# Patient Record
Sex: MALE | Race: ASIAN | NOT HISPANIC OR LATINO
[De-identification: names, ages, dates, MRNs, and addresses within clinical notes are randomized per-mention and may not be internally consistent; named-entity substitution may affect disease eponyms.]

---

## 2019-02-17 DIAGNOSIS — Z86.79 PERSONAL HISTORY OF OTHER DISEASES OF THE CIRCULATORY SYSTEM: ICD-10-CM

## 2019-02-18 ENCOUNTER — APPOINTMENT (OUTPATIENT)
Dept: CARDIOLOGY | Facility: CLINIC | Age: 59
End: 2019-02-18
Payer: COMMERCIAL

## 2019-02-18 ENCOUNTER — RX RENEWAL (OUTPATIENT)
Age: 59
End: 2019-02-18

## 2019-02-18 VITALS
WEIGHT: 192 LBS | HEART RATE: 64 BPM | TEMPERATURE: 98.4 F | OXYGEN SATURATION: 97 % | HEIGHT: 68 IN | BODY MASS INDEX: 29.1 KG/M2 | SYSTOLIC BLOOD PRESSURE: 132 MMHG | DIASTOLIC BLOOD PRESSURE: 83 MMHG

## 2019-02-18 DIAGNOSIS — Z82.0 FAMILY HISTORY OF EPILEPSY AND OTHER DISEASES OF THE NERVOUS SYSTEM: ICD-10-CM

## 2019-02-18 DIAGNOSIS — Z82.49 FAMILY HISTORY OF ISCHEMIC HEART DISEASE AND OTHER DISEASES OF THE CIRCULATORY SYSTEM: ICD-10-CM

## 2019-02-18 DIAGNOSIS — Z82.5 FAMILY HISTORY OF ASTHMA AND OTHER CHRONIC LOWER RESPIRATORY DISEASES: ICD-10-CM

## 2019-02-18 DIAGNOSIS — Z78.9 OTHER SPECIFIED HEALTH STATUS: ICD-10-CM

## 2019-02-18 DIAGNOSIS — Z86.39 PERSONAL HISTORY OF OTHER ENDOCRINE, NUTRITIONAL AND METABOLIC DISEASE: ICD-10-CM

## 2019-02-18 PROCEDURE — 99205 OFFICE O/P NEW HI 60 MIN: CPT

## 2019-02-18 PROCEDURE — 93000 ELECTROCARDIOGRAM COMPLETE: CPT

## 2019-02-20 ENCOUNTER — NON-APPOINTMENT (OUTPATIENT)
Age: 59
End: 2019-02-20

## 2019-02-20 PROBLEM — Z86.39 HISTORY OF HYPERTHYROIDISM: Status: RESOLVED | Noted: 2019-02-20 | Resolved: 2019-02-20

## 2019-02-20 PROBLEM — Z82.49 FAMILY HISTORY OF ATRIAL FIBRILLATION: Status: ACTIVE | Noted: 2019-02-20

## 2019-02-20 PROBLEM — Z78.9 ALCOHOL USE: Status: ACTIVE | Noted: 2019-02-20

## 2019-02-20 PROBLEM — Z82.0 FAMILY HISTORY OF PARKINSON'S DISEASE: Status: ACTIVE | Noted: 2019-02-20

## 2019-02-20 PROBLEM — Z82.5 FAMILY HISTORY OF CHRONIC OBSTRUCTIVE PULMONARY DISEASE: Status: ACTIVE | Noted: 2019-02-20

## 2019-02-22 ENCOUNTER — TRANSCRIPTION ENCOUNTER (OUTPATIENT)
Age: 59
End: 2019-02-22

## 2019-02-23 NOTE — DISCUSSION/SUMMARY
[FreeTextEntry1] : Atherosclerotic heart disease\par Mr. Carranza has known atherosclerotic heart disease. Although details are not available he reportedly had a myocardial infarction in 1999 requiring coronary stenting. An additional coronary stent was required in 2009. The details angiographic reports noninvasive studies etc. are all not presently available for review.\par \par In view of the lack of symptoms and clinical course continued medical management is indicated at this time. Review of prior cardiac records not available at this time would be helpful for management decisions.\par \par I have recommended the following:\par 1 risk factor modification\par 2 continue present medical regimen\par 3 previous cardiac records including angiographic reports, cardiology notes stress test laboratory studies etc. are requested for review as discussed above. \par 4. Further cardiac evaluation and treatment will be dependent on the review of the above data\par \par \par \par \par Hypertension\par Hypertension has reportedly been controlled on the present medical regimen. In the setting of atherosclerotic heart disease and previous myocardial infarction beta blocker and ACE-I./ARB therapy  are attractive.. The doses of both valsartan and metoprolol (with monitoring for bradycardia) may be increased if required to maintain optimal levels.\par \par I have recommended the following:\par 1 low-salt low-fat low-cholesterol diet. Regular aerobic exercise\par 2 continue present medical regimen\par 3 increase valsartan and/or metoprolol   (with monitoring for bradycardia) if required to maintain optimal levels as discussed above\par \par \par Hyperlipidemia\par Hyperlipidemia represents a risk factor for progressive atherosclerotic disease. The target LDL level with known atherosclerotic heart disease is about 70. The most recent lipid levels are not available for review. In 6/17 the serum cholesterol level was 187 triglycerides 205 HDL 45 and . The dose of simvastatin may be increased or changed to a high intensity statin (Crestor or Lipitor)  if required to obtain optimal levels. Nonpharmacological therapy, specifically diet and exercise are emphasized  as major aspects of treatment.\par \par I recommended the following:\par 1 low-salt low-fat low-cholesterol diet. Regular aerobic exercise\par 2 continue present medical regimen\par 3 routine laboratories data including lipid profile through primary care Dr. Aldana\par 4 target LDL level to about 70 as discussed above\par 5 increase simvastatin dose or change to high intensity statin if required to obtain optimal levels as discussed above.

## 2019-02-23 NOTE — PHYSICAL EXAM
[Normal Conjunctiva] : the conjunctiva exhibited no abnormalities [Heart Rate And Rhythm] : heart rate and rhythm were normal [Heart Sounds] : normal S1 and S2 [Murmurs] : no murmurs present [Arterial Pulses Normal] : the arterial pulses were normal [Edema] : no peripheral edema present [Veins - Varicosity Changes] : no varicosital changes were noted in the lower extremities [Auscultation Breath Sounds / Voice Sounds] : lungs were clear to auscultation bilaterally [Bowel Sounds] : normal bowel sounds [Abdomen Soft] : soft [Abdomen Tenderness] : non-tender [] : no rash [Skin Lesions] : no skin lesions [Affect] : the affect was normal [Abnormal Walk] : normal gait [FreeTextEntry1] : pleasant

## 2019-02-23 NOTE — HISTORY OF PRESENT ILLNESS
[FreeTextEntry1] : 58-year-old man\par Cardiology consultation requested by  because of atherosclerotic heart disease\par \par Mr. Carranza has known atherosclerotic heart disease. Although details are not available he reportedly had a myocardial infarction in 1999 requiring coronary stenting. An additional coronary stent was required in 2009. The details angiographic reports noninvasive studies etc. are all not presently available for review.\par \par Since that time he has had no recurrent cardiac event. He attributes the coronary disease between 1999 and 2009 do to stress related to his job in the [employer] with associated alcohol and cocaine use which is now stopped. \par \par He presently feels well and in particular denies chest pain shortness of breath palpitations or syncope.\par \par There is no history of diabetes. He has known hyperlipidemia and hypertension controlled with medical therapy .\par \par Tucker presents now for cardiovascular reevaluation.

## 2019-03-16 ENCOUNTER — LABORATORY RESULT (OUTPATIENT)
Age: 59
End: 2019-03-16

## 2019-03-16 ENCOUNTER — APPOINTMENT (OUTPATIENT)
Dept: INTERNAL MEDICINE | Facility: CLINIC | Age: 59
End: 2019-03-16
Payer: COMMERCIAL

## 2019-03-16 PROCEDURE — 36415 COLL VENOUS BLD VENIPUNCTURE: CPT

## 2019-03-18 ENCOUNTER — RX CHANGE (OUTPATIENT)
Age: 59
End: 2019-03-18

## 2019-03-18 LAB
ALBUMIN SERPL ELPH-MCNC: 4.9 G/DL
ALP BLD-CCNC: 59 U/L
ALT SERPL-CCNC: 31 U/L
ANION GAP SERPL CALC-SCNC: 13 MMOL/L
AST SERPL-CCNC: 23 U/L
BILIRUB SERPL-MCNC: 0.2 MG/DL
BUN SERPL-MCNC: 14 MG/DL
CALCIUM SERPL-MCNC: 9.7 MG/DL
CHLORIDE SERPL-SCNC: 106 MMOL/L
CHOLEST SERPL-MCNC: 171 MG/DL
CHOLEST/HDLC SERPL: 4.6 RATIO
CO2 SERPL-SCNC: 24 MMOL/L
CREAT SERPL-MCNC: 1.02 MG/DL
GLUCOSE SERPL-MCNC: 94 MG/DL
HBA1C MFR BLD HPLC: 6.3 %
HDLC SERPL-MCNC: 37 MG/DL
LDLC SERPL CALC-MCNC: 89 MG/DL
POTASSIUM SERPL-SCNC: 4.6 MMOL/L
PROT SERPL-MCNC: 7 G/DL
SODIUM SERPL-SCNC: 143 MMOL/L
TRIGL SERPL-MCNC: 226 MG/DL
TSH SERPL-ACNC: 3.9 UIU/ML

## 2019-03-20 ENCOUNTER — RX RENEWAL (OUTPATIENT)
Age: 59
End: 2019-03-20

## 2019-03-20 ENCOUNTER — RX CHANGE (OUTPATIENT)
Age: 59
End: 2019-03-20

## 2019-03-31 ENCOUNTER — TRANSCRIPTION ENCOUNTER (OUTPATIENT)
Age: 59
End: 2019-03-31

## 2019-04-17 ENCOUNTER — CLINICAL ADVICE (OUTPATIENT)
Age: 59
End: 2019-04-17

## 2019-06-25 ENCOUNTER — CLINICAL ADVICE (OUTPATIENT)
Age: 59
End: 2019-06-25

## 2019-07-08 ENCOUNTER — RX CHANGE (OUTPATIENT)
Age: 59
End: 2019-07-08

## 2019-07-10 ENCOUNTER — CLINICAL ADVICE (OUTPATIENT)
Age: 59
End: 2019-07-10

## 2019-08-02 ENCOUNTER — RX CHANGE (OUTPATIENT)
Age: 59
End: 2019-08-02

## 2019-08-26 ENCOUNTER — RX RENEWAL (OUTPATIENT)
Age: 59
End: 2019-08-26

## 2019-11-18 ENCOUNTER — RX RENEWAL (OUTPATIENT)
Age: 59
End: 2019-11-18

## 2019-11-22 ENCOUNTER — RX RENEWAL (OUTPATIENT)
Age: 59
End: 2019-11-22

## 2019-12-24 ENCOUNTER — RX CHANGE (OUTPATIENT)
Age: 59
End: 2019-12-24

## 2019-12-24 DIAGNOSIS — J01.00 ACUTE MAXILLARY SINUSITIS, UNSPECIFIED: ICD-10-CM

## 2019-12-24 DIAGNOSIS — J06.9 ACUTE UPPER RESPIRATORY INFECTION, UNSPECIFIED: ICD-10-CM

## 2019-12-24 DIAGNOSIS — Z87.2 PERSONAL HISTORY OF DISEASES OF THE SKIN AND SUBCUTANEOUS TISSUE: ICD-10-CM

## 2020-02-01 ENCOUNTER — LABORATORY RESULT (OUTPATIENT)
Age: 60
End: 2020-02-01

## 2020-02-01 ENCOUNTER — APPOINTMENT (OUTPATIENT)
Dept: FAMILY MEDICINE | Facility: CLINIC | Age: 60
End: 2020-02-01
Payer: COMMERCIAL

## 2020-02-01 VITALS
SYSTOLIC BLOOD PRESSURE: 132 MMHG | HEART RATE: 72 BPM | BODY MASS INDEX: 29.86 KG/M2 | HEIGHT: 68 IN | WEIGHT: 197 LBS | DIASTOLIC BLOOD PRESSURE: 90 MMHG

## 2020-02-01 VITALS — SYSTOLIC BLOOD PRESSURE: 118 MMHG | DIASTOLIC BLOOD PRESSURE: 90 MMHG

## 2020-02-01 DIAGNOSIS — M10.9 GOUT, UNSPECIFIED: ICD-10-CM

## 2020-02-01 PROCEDURE — G0442 ANNUAL ALCOHOL SCREEN 15 MIN: CPT | Mod: NC,59

## 2020-02-01 PROCEDURE — 36415 COLL VENOUS BLD VENIPUNCTURE: CPT

## 2020-02-01 PROCEDURE — 90471 IMMUNIZATION ADMIN: CPT

## 2020-02-01 PROCEDURE — 99396 PREV VISIT EST AGE 40-64: CPT | Mod: 25

## 2020-02-01 PROCEDURE — 90715 TDAP VACCINE 7 YRS/> IM: CPT

## 2020-02-01 PROCEDURE — G0447 BEHAVIOR COUNSEL OBESITY 15M: CPT | Mod: NC

## 2020-02-01 RX ORDER — AMOXICILLIN AND CLAVULANATE POTASSIUM 875; 125 MG/1; MG/1
875-125 TABLET, COATED ORAL TWICE DAILY
Qty: 28 | Refills: 1 | Status: DISCONTINUED | COMMUNITY
Start: 2019-03-20 | End: 2020-02-01

## 2020-02-01 RX ORDER — FINASTERIDE 5 MG/1
5 TABLET, FILM COATED ORAL DAILY
Qty: 30 | Refills: 2 | Status: DISCONTINUED | COMMUNITY
Start: 2019-07-08 | End: 2020-02-01

## 2020-02-01 NOTE — ASSESSMENT
[FreeTextEntry1] : BP is controlled\par Weight is too high- must focus on reduced intake and regular exercise,\par Back strain appears mechanical.\par Due for colon cancer scrrning\par Needs monitoring of his lipids, HgbA1c and TFTs given his history. \par

## 2020-02-01 NOTE — HEALTH RISK ASSESSMENT
[Very Good] : ~his/her~  mood as very good [Good] : ~his/her~ current health as good [2 - 4 times a month (2 pts)] : 2-4 times a month (2 points) [Yes] : Yes [Never (0 pts)] : Never (0 points) [1 or 2 (0 pts)] : 1 or 2 (0 points) [No] : In the past 12 months have you used drugs other than those required for medical reasons? No [No falls in past year] : Patient reported no falls in the past year [(PHQ-2) Unable to screen] : PHQ-2: unable to screen [0] : 2) Feeling down, depressed, or hopeless: Not at all (0) [Patient declined Retinal Exam] : Patient declined Retinal Exam. [Patient reported colonoscopy was normal] : Patient reported colonoscopy was normal [HIV test declined] : HIV test declined [Hepatitis C test declined] : Hepatitis C test declined [# of Members in Household ___] :  household currently consist of [unfilled] member(s) [With Significant Other] : lives with significant other [None] : None [Employed] : employed [College] : College [# Of Children ___] : has [unfilled] children [] :  [Sexually Active] : sexually active [Fully functional (bathing, dressing, toileting, transferring, walking, feeding)] : Fully functional (bathing, dressing, toileting, transferring, walking, feeding) [Fully functional (using the telephone, shopping, preparing meals, housekeeping, doing laundry, using] : Fully functional and needs no help or supervision to perform IADLs (using the telephone, shopping, preparing meals, housekeeping, doing laundry, using transportation, managing medications and managing finances) [Smoke Detector] : smoke detector [Sunscreen] : uses sunscreen [Carbon Monoxide Detector] : carbon monoxide detector [With Patient/Caregiver] : With Patient/Caregiver [Name: ___] : Health Care Proxy's Name: [unfilled]  [Relationship: ___] : Relationship: [unfilled] [Designated Healthcare Proxy] : Designated healthcare proxy [DNR] : DNR [DNI] : DNI [FreeTextEntry1] : right lower back and buttock [] : No [Audit-CScore] : 2 [de-identified] : Fair [de-identified] : None [[id number]] : 0 [Change in mental status noted] : No change in mental status noted [High Risk Behavior] : no high risk behavior [Reports changes in hearing] : Reports no changes in hearing [Reports changes in vision] : Reports no changes in vision [Reports changes in dental health] : Reports no changes in dental health [Guns at Home] : no guns at home [ColonoscopyDate] : 08/10 [AdvancecareDate] : 02/20

## 2020-02-01 NOTE — COUNSELING
[Benefits of weight loss discussed] : Benefits of weight loss discussed [Encouraged to increase physical activity] : Encouraged to increase physical activity [Target Wt Loss Goal ___] : Weight Loss Goals: Target weight loss goal [unfilled] lbs [Decrease Portions] : decrease portions [Good understanding] : Patient has a good understanding of disease, goals and obesity follow-up plan [FreeTextEntry2] : Over intake of carbs and sugars.  [FreeTextEntry4] : 15

## 2020-02-01 NOTE — HISTORY OF PRESENT ILLNESS
[FreeTextEntry1] : Annual PE\par Needs TdaP [de-identified] : Mr. ALEC GARCIA is a 59 year old male here today for annual physical. Hx of CAD - followed by cardiology.\par Hx of hypothyroidism following ablation for hyperthyroidism. On Synthroid\par pt c/o right lower back and buttock pain for last few weeks- seeing chiropractor and taking NSAIDs. Back is very stiff and worse in the am. Has tried heat.\par Travellng to [place] in 2 weeks- needs TdaP and [facility].\par Overdue for colon cancer screening. LAst one was 10 years. No FH of colon cancer.\par

## 2020-02-01 NOTE — PLAN
[FreeTextEntry1] : Encouraged patient to continue to focus on weight through sensible and healthy eating and as much aerobic exercise as is tolerable.\par Reduce salt intake.\par Continue current medications. \par Encouraged continued use of NSAIDs and heat. \par

## 2020-02-01 NOTE — PHYSICAL EXAM
[Deep Tendon Reflexes (DTR)] : deep tendon reflexes were 2+ and symmetric [Normal] : no rash [de-identified] : trace pitting edema in lower legs [de-identified] : Tenderness in left paraspinal mm. Tight hamstrings. Tenderness into the buttock

## 2020-02-01 NOTE — REVIEW OF SYSTEMS
[Negative] : Psychiatric [Recent Change In Weight] : ~T no recent weight change [FreeTextEntry9] : Right lower back and buttock pain.

## 2020-02-03 LAB
ALBUMIN SERPL ELPH-MCNC: 4.8 G/DL
ALP BLD-CCNC: 51 U/L
ALT SERPL-CCNC: 27 U/L
ANION GAP SERPL CALC-SCNC: 11 MMOL/L
AST SERPL-CCNC: 23 U/L
BILIRUB SERPL-MCNC: 0.4 MG/DL
BUN SERPL-MCNC: 15 MG/DL
CALCIUM SERPL-MCNC: 9.8 MG/DL
CHLORIDE SERPL-SCNC: 104 MMOL/L
CHOLEST SERPL-MCNC: 201 MG/DL
CHOLEST/HDLC SERPL: 5.5 RATIO
CO2 SERPL-SCNC: 24 MMOL/L
CREAT SERPL-MCNC: 0.99 MG/DL
ESTIMATED AVERAGE GLUCOSE: 128 MG/DL
GLUCOSE SERPL-MCNC: 95 MG/DL
HBA1C MFR BLD HPLC: 6.1 %
HCV AB SER QL: NONREACTIVE
HCV S/CO RATIO: 0.13 S/CO
HDLC SERPL-MCNC: 37 MG/DL
LDLC SERPL CALC-MCNC: 114 MG/DL
POTASSIUM SERPL-SCNC: 4.4 MMOL/L
PROT SERPL-MCNC: 6.8 G/DL
PSA SERPL-MCNC: 0.32 NG/ML
SODIUM SERPL-SCNC: 139 MMOL/L
TRIGL SERPL-MCNC: 250 MG/DL
TSH SERPL-ACNC: 6.45 UIU/ML

## 2020-03-05 LAB — NONINV COLON CA DNA+OCC BLD SCRN STL QL: NEGATIVE

## 2020-03-26 ENCOUNTER — APPOINTMENT (OUTPATIENT)
Dept: PAIN MANAGEMENT | Facility: CLINIC | Age: 60
End: 2020-03-26
Payer: COMMERCIAL

## 2020-03-26 VITALS
HEIGHT: 68 IN | DIASTOLIC BLOOD PRESSURE: 62 MMHG | BODY MASS INDEX: 28.34 KG/M2 | WEIGHT: 187 LBS | SYSTOLIC BLOOD PRESSURE: 110 MMHG

## 2020-03-26 PROCEDURE — 99204 OFFICE O/P NEW MOD 45 MIN: CPT

## 2020-03-26 RX ORDER — AMOXICILLIN AND CLAVULANATE POTASSIUM 875; 125 MG/1; MG/1
875-125 TABLET, COATED ORAL TWICE DAILY
Qty: 28 | Refills: 0 | Status: DISCONTINUED | COMMUNITY
Start: 2019-06-25 | End: 2020-03-26

## 2020-03-26 NOTE — ASSESSMENT
[FreeTextEntry1] : 59 yom presents w/ one year of left-sided low back pain. Then, approximately one month ago, the pain worsened. Pain now radiates on occasion down the posterior aspect of the left lower extremity. I have personally reviewed the patient's MRI in detail and discussed it with them which is significant for moderate to severe left foraminal stenosis at L5-S1, and symptoms are most consistent w/ S1 radiculopathy. Would recommend MARIXA, however, recommend against at this time due to COVID-19 outbreak and no scheduling of elective procedures. Discussed HEP in detail. Continue gabapentin 300 mg TID and if no relief and no side effects in two weeks may titrate to 600 mg TID. RTC one month.

## 2020-03-26 NOTE — DATA REVIEWED
[FreeTextEntry1] : MRI LUMBAR SPINE		(03/15/20):			\par \par There is a lumbar levoscoliosis with apex at L2-L3. There is mild straightening of the lumbar lordosis. Lumbar vertebral body heights are preserved; there is no compression deformity. There is no marrow signal abnormality; specifically, there is no marrow edema.\par \par There is multilevel disc degeneration with loss of disc space height.\par \par At T12-L1: Minimal disc bulge flattening the ventral thecal sac. Mild facet and ligamentous hypertrophy contributing to mild segmental canal stenosis. Moderate left neural foraminal narrowing secondary to facet hypertrophy.\par \par At L1-L2: Mild disc bulge lateralized to the left and moderate facet and mild ligamentous hypertrophy contributing to mild segmental canal stenosis and mild left lateral recess stenosis. Moderate left neural foraminal narrowing secondary to facet hypertrophy and lateralized disc bulge.\par \par At L2-L3: Mild disc bulge flattening the ventral thecal sac with combination of moderate facet and mild ligamentous hypertrophy resulting in mild segmental canal stenosis. No significant neural foraminal narrowing.\par \par At L3-L4: Mild disc bulge and combination of moderate facet and ligamentous hypertrophy resulting in moderate segmental canal stenosis and mild right lateral recess stenosis. Moderate to severe right neural foraminal narrowing.\par \par At L4-L5: Mild disc bulge and combination of moderate facet and ligamentous hypertrophy resulting in mild segmental canal stenosis and mild left lateral recess stenosis. Moderate left and mild right neural foraminal narrowing.\par \par At L5-S1: Mild disc bulge lateralized to the left with combination of moderate facet and mild ligamentous hypertrophy resulting in mild segmental canal stenosis and moderate left lateral recess stenosis. Moderate to severe left neural foraminal narrowing and mild right neural foraminal narrowing.\par \par The conus medullaris terminates at L1. There is no signal abnormality within the visualized thoracic cord. There is no evidence of an intraspinal mass or epidural collection. There is no paraspinal soft tissue abnormality. The visualized portions of the abdomen and pelvis are grossly unremarkable.\par \par \par \par \par IMPRESSION:\par \par Multilevel lumbar spondylosis, as described. Moderate segmental canal stenosis at L3-L4. Moderate left lateral recess stenosis at L5-S1. Mild right lateral recess stenosis at L3-L4. Mild left lateral recess stenosis at L1-L2 and L4-L5. Multilevel neural foraminal narrowing, most severe on the right at L3-L4 and on the left at L5-S1.\par

## 2020-03-26 NOTE — PHYSICAL EXAM
[de-identified] : Constitutional: Well-developed, in no acute distress\par Eyes: Pupil- Right: normal, Left: normal\par Neck exam: Inspection normal\par Respiratory: Normal effort, speaking in full sentences\par Vascular: no edema noted\par Abdomen: Inspection normal, no distension\par Skin: Inspection normal, no bruising noted\par Musculoskeletal:\par Lumbar Spine:   Gait: normal\par 		Muscle Strength:\par 		Iliopsoas: 5/5 bilaterally\par 		Quadriceps: 5/5 bilaterally\par 		Hamstrings: 5/5 bilaterally\par 		Tibialis anterior: 5/5 bilaterally\par 		Extensor hallucis longus: 5/5 bilaterally\par \par 		Sensation: normal and equal in bilateral lower extremities\par \par \par Extremity: no edema noted\par Neurological: Memory normal, AAO x 3, Cranial nerves II - XII grossly normal\par Psychiatric: Appropriate mood and affect, oriented to time, place, person, and situation\par Examination limited due to distancing due to outbreak of SARVS-CoV2\par \par \par

## 2020-03-26 NOTE — HISTORY OF PRESENT ILLNESS
[___ mths] : [unfilled] month(s) ago [7] : a maximum pain level of 7/10 [Burning] : burning [Shooting] : shooting [Standing] : standing [Rest] : rest [FreeTextEntry1] : 59 yom presents w/ one year of left-sided low back pain. Pain began about a year ago and was manageable, however, the severity did make the patient have to stop going to the gym and doing yoga. Then, approximately one month ago, the pain worsened. Pain now radiates on occasion down the posterior aspect of the left lower extremity. Right leg is within normal limits. He does have periods of time with no pain, including on a recent safari. He has used Aleve and just started gabapentin this week. No other recent changes in health. [FreeTextEntry7] : rt side lower back

## 2020-05-05 ENCOUNTER — RX RENEWAL (OUTPATIENT)
Age: 60
End: 2020-05-05

## 2020-05-24 ENCOUNTER — RESULT REVIEW (OUTPATIENT)
Age: 60
End: 2020-05-24

## 2020-05-26 ENCOUNTER — RESULT REVIEW (OUTPATIENT)
Age: 60
End: 2020-05-26

## 2020-05-26 ENCOUNTER — APPOINTMENT (OUTPATIENT)
Dept: PAIN MANAGEMENT | Facility: HOSPITAL | Age: 60
End: 2020-05-26

## 2020-06-08 ENCOUNTER — APPOINTMENT (OUTPATIENT)
Dept: FAMILY MEDICINE | Facility: CLINIC | Age: 60
End: 2020-06-08
Payer: COMMERCIAL

## 2020-06-08 VITALS
BODY MASS INDEX: 28.64 KG/M2 | DIASTOLIC BLOOD PRESSURE: 80 MMHG | SYSTOLIC BLOOD PRESSURE: 130 MMHG | TEMPERATURE: 98 F | HEIGHT: 68 IN | WEIGHT: 189 LBS

## 2020-06-08 VITALS — SYSTOLIC BLOOD PRESSURE: 120 MMHG | DIASTOLIC BLOOD PRESSURE: 90 MMHG

## 2020-06-08 VITALS — HEART RATE: 70 BPM

## 2020-06-08 DIAGNOSIS — Z71.84 ENC FOR HEALTH COUNSELING RELATED TO TRAVEL: ICD-10-CM

## 2020-06-08 DIAGNOSIS — Z12.11 ENCOUNTER FOR SCREENING FOR MALIGNANT NEOPLASM OF COLON: ICD-10-CM

## 2020-06-08 DIAGNOSIS — Z23 ENCOUNTER FOR IMMUNIZATION: ICD-10-CM

## 2020-06-08 DIAGNOSIS — E66.9 OBESITY, UNSPECIFIED: ICD-10-CM

## 2020-06-08 DIAGNOSIS — S39.012A STRAIN OF MUSCLE, FASCIA AND TENDON OF LOWER BACK, INITIAL ENCOUNTER: ICD-10-CM

## 2020-06-08 PROCEDURE — 36415 COLL VENOUS BLD VENIPUNCTURE: CPT

## 2020-06-08 PROCEDURE — 99213 OFFICE O/P EST LOW 20 MIN: CPT | Mod: 25

## 2020-06-08 PROCEDURE — G0444 DEPRESSION SCREEN ANNUAL: CPT | Mod: 59

## 2020-06-08 RX ORDER — ATOVAQUONE AND PROGUANIL HYDROCHLORIDE 250; 100 MG/1; MG/1
250-100 TABLET, FILM COATED ORAL DAILY
Qty: 24 | Refills: 0 | Status: DISCONTINUED | COMMUNITY
Start: 2020-02-01 | End: 2020-06-08

## 2020-06-08 RX ORDER — ALPRAZOLAM 1 MG/1
1 TABLET ORAL
Qty: 15 | Refills: 0 | Status: DISCONTINUED | COMMUNITY
Start: 2019-04-17 | End: 2020-06-08

## 2020-06-08 RX ORDER — METOPROLOL SUCCINATE 200 MG/1
TABLET, EXTENDED RELEASE ORAL
Refills: 0 | Status: DISCONTINUED | COMMUNITY
End: 2020-06-08

## 2020-06-08 RX ORDER — ASPIRIN 325 MG/1
TABLET, FILM COATED ORAL
Refills: 0 | Status: ACTIVE | COMMUNITY

## 2020-06-08 RX ORDER — PREDNISONE 5 MG/1
5 TABLET ORAL
Qty: 40 | Refills: 0 | Status: DISCONTINUED | COMMUNITY
Start: 2019-06-25 | End: 2020-06-08

## 2020-06-08 RX ORDER — METHYLPREDNISOLONE 4 MG/1
4 TABLET ORAL
Qty: 1 | Refills: 0 | Status: DISCONTINUED | COMMUNITY
Start: 2020-02-07 | End: 2020-06-08

## 2020-06-08 RX ORDER — METRONIDAZOLE 7.5 MG/G
0.75 GEL TOPICAL TWICE DAILY
Qty: 1 | Refills: 0 | Status: DISCONTINUED | COMMUNITY
Start: 2019-07-10 | End: 2020-06-08

## 2020-06-08 RX ORDER — INDOMETHACIN 50 MG/1
50 CAPSULE ORAL 3 TIMES DAILY
Qty: 15 | Refills: 0 | Status: DISCONTINUED | COMMUNITY
Start: 2019-12-24 | End: 2020-06-08

## 2020-06-08 RX ORDER — CYCLOBENZAPRINE HYDROCHLORIDE 10 MG/1
10 TABLET, FILM COATED ORAL
Qty: 30 | Refills: 1 | Status: DISCONTINUED | COMMUNITY
Start: 2020-02-01 | End: 2020-06-08

## 2020-06-08 RX ORDER — SULFAMETHOXAZOLE AND TRIMETHOPRIM 800; 160 MG/1; MG/1
800-160 TABLET ORAL TWICE DAILY
Qty: 60 | Refills: 1 | Status: DISCONTINUED | COMMUNITY
Start: 2019-07-08 | End: 2020-06-08

## 2020-06-08 NOTE — REVIEW OF SYSTEMS
[Recent Change In Weight] : ~T no recent weight change [Fatigue] : no fatigue [Negative] : Psychiatric

## 2020-06-08 NOTE — PLAN
[FreeTextEntry1] : Encouraged patient to continue to focus on weight through sensible and healthy eating and as much aerobic exercise as is tolerable.\par Referral to ENT for cerumen evacuation. \par Continue current medications.\par Watch salt intake. \par

## 2020-06-08 NOTE — HEALTH RISK ASSESSMENT
[Yes] : Yes [2 - 4 times a month (2 pts)] : 2-4 times a month (2 points) [1 or 2 (0 pts)] : 1 or 2 (0 points) [Never (0 pts)] : Never (0 points) [No] : In the past 12 months have you used drugs other than those required for medical reasons? No [No falls in past year] : Patient reported no falls in the past year [0] : 2) Feeling down, depressed, or hopeless: Not at all (0) [Audit-CScore] : 2 [] : No [de-identified] : normal [[id number]] : 0 [de-identified] : stretching

## 2020-06-08 NOTE — HISTORY OF PRESENT ILLNESS
[FreeTextEntry1] : Follow-up [de-identified] : Mr. ALEC GARCIA is a 59 year old male here today for follow-up. Last blood tests showed suboptimal control of his lipids. Increased statin to 80 mg. Last HgbA1c was in the prediabetic range. He is trying to lose weight. Still eats ice cream occasionally. Eats an above average amount of white rice. Not doing much aerobic exercise due to a chronic back issue. Had an epidural injection 2 weeks ago- only has helped minimally. \par Last TSH was alittle elevated. Free T4 was normal.\par

## 2020-06-08 NOTE — PHYSICAL EXAM
[No Lymphadenopathy] : no lymphadenopathy [Clear to Auscultation] : lungs were clear to auscultation bilaterally [Thyroid Normal, No Nodules] : the thyroid was normal and there were no nodules present [Normal S1, S2] : normal S1 and S2 [Normal Rate] : normal rate  [Regular Rhythm] : with a regular rhythm [Normal] : affect was normal and insight and judgment were intact [de-identified] : Bilateral cerumen impaction [de-identified] : Mild pitting edema of the lower extremities.

## 2020-06-09 LAB
ALBUMIN SERPL ELPH-MCNC: 5 G/DL
ALP BLD-CCNC: 56 U/L
ALT SERPL-CCNC: 85 U/L
ANION GAP SERPL CALC-SCNC: 17 MMOL/L
AST SERPL-CCNC: 50 U/L
BILIRUB SERPL-MCNC: 0.4 MG/DL
BUN SERPL-MCNC: 18 MG/DL
CALCIUM SERPL-MCNC: 9.8 MG/DL
CHLORIDE SERPL-SCNC: 103 MMOL/L
CHOLEST SERPL-MCNC: 164 MG/DL
CHOLEST/HDLC SERPL: 4 RATIO
CO2 SERPL-SCNC: 21 MMOL/L
CREAT SERPL-MCNC: 0.99 MG/DL
ESTIMATED AVERAGE GLUCOSE: 131 MG/DL
GLUCOSE SERPL-MCNC: 90 MG/DL
HBA1C MFR BLD HPLC: 6.2 %
HDLC SERPL-MCNC: 41 MG/DL
LDLC SERPL CALC-MCNC: 95 MG/DL
POTASSIUM SERPL-SCNC: 4.3 MMOL/L
PROT SERPL-MCNC: 7 G/DL
SODIUM SERPL-SCNC: 141 MMOL/L
T3 SERPL-MCNC: 67 NG/DL
T4 SERPL-MCNC: 6.6 UG/DL
TRIGL SERPL-MCNC: 139 MG/DL
TSH SERPL-ACNC: 2.23 UIU/ML

## 2020-06-30 LAB
ALBUMIN SERPL ELPH-MCNC: 4.9 G/DL
ALP BLD-CCNC: 64 U/L
ALT SERPL-CCNC: 328 U/L
AST SERPL-CCNC: 102 U/L
BILIRUB DIRECT SERPL-MCNC: 0.2 MG/DL
BILIRUB INDIRECT SERPL-MCNC: 0.3 MG/DL
BILIRUB SERPL-MCNC: 0.4 MG/DL
PROT SERPL-MCNC: 6.8 G/DL

## 2020-07-14 LAB
ALBUMIN SERPL ELPH-MCNC: 5 G/DL
ALP BLD-CCNC: 62 U/L
ALT SERPL-CCNC: 98 U/L
AST SERPL-CCNC: 32 U/L
BILIRUB DIRECT SERPL-MCNC: 0.1 MG/DL
BILIRUB INDIRECT SERPL-MCNC: 0.3 MG/DL
BILIRUB SERPL-MCNC: 0.4 MG/DL
PROT SERPL-MCNC: 7 G/DL

## 2020-07-22 DIAGNOSIS — M51.36 OTHER INTERVERTEBRAL DISC DEGENERATION, LUMBAR REGION: ICD-10-CM

## 2020-07-30 ENCOUNTER — APPOINTMENT (OUTPATIENT)
Dept: PAIN MANAGEMENT | Facility: CLINIC | Age: 60
End: 2020-07-30
Payer: COMMERCIAL

## 2020-07-30 VITALS
SYSTOLIC BLOOD PRESSURE: 126 MMHG | BODY MASS INDEX: 28.64 KG/M2 | HEIGHT: 68 IN | TEMPERATURE: 98 F | DIASTOLIC BLOOD PRESSURE: 82 MMHG | WEIGHT: 189 LBS

## 2020-07-30 PROCEDURE — 99214 OFFICE O/P EST MOD 30 MIN: CPT

## 2020-07-30 NOTE — HISTORY OF PRESENT ILLNESS
[3] : 3. What number best describes how, during the past week, pain has interfered with your general activity? 3/10 pain [2] : 2. What number best describes how, during the past week, pain has interfered with your enjoyment of life? 2/10 pain [5] : 1. What number best describes your pain on average in the past week? 5/10 pain [___ mths] : [unfilled] month(s) ago [Burning] : burning [7] : a maximum pain level of 7/10 [Shooting] : shooting [Rest] : rest [Standing] : standing [FreeTextEntry1] : As per my note dated 03/26/20: "59 yom presents w/ one year of left-sided low back pain. Pain began about a year ago and was manageable, however, the severity did make the patient have to stop going to the gym and doing yoga. Then, approximately one month ago, the pain worsened. Pain now radiates on occasion down the posterior aspect of the left lower extremity. Right leg is within normal limits. He does have periods of time with no pain, including on a recent safari. He has used Aleve and just started gabapentin this week. No other recent changes in health."\par \par Pt returns for follow-up today, 07/30/20. He had significant relief from his MARIXA. Pain remains. Pain is worst at night. He has seen Dr. Gentile who advised against surgery and to consider repeat MARIXA at this time.\par \par Interventions:\par L5-S1 interlaminar MARIXA (05/26/20): [FreeTextEntry7] : rt side lower back [FreeTextEntry2] : 10

## 2020-07-30 NOTE — ASSESSMENT
[FreeTextEntry1] : 59 yom presents w/ one year of left-sided low back pain that radiates down the posterior aspect of the left lower extremity. He had significant relief from MARIXA but pain remains.  I have again personally reviewed the patient's MRI in detail and discussed it with them which is significant for moderate to severe left foraminal stenosis at L5-S1, and symptoms are most consistent w/ S1 radiculopathy. The patient has failed to have relief with medication management and physical therapy. Given the patients failure to improve with all other conservative measures, recommend left L5-S1 transforaminal epidural steroid injection under fluoroscopic guidance. The patient will follow-up with me in my office two weeks following intervention. If no relief f/u w/ Dr. Gentile.\par

## 2020-07-30 NOTE — PHYSICAL EXAM
[de-identified] : Constitutional: Well-developed, in no acute distress\par Eyes: Pupil- Right: normal, Left: normal\par Neck exam: Inspection normal\par Respiratory: Normal effort, speaking in full sentences\par Vascular: no edema noted\par Abdomen: Inspection normal, no distension\par Skin: Inspection normal, no bruising noted\par Musculoskeletal:\par Lumbar Spine:   Gait: normal\par 		Muscle Strength:\par 		Iliopsoas: 5/5 bilaterally\par 		Quadriceps: 5/5 bilaterally\par 		Hamstrings: 5/5 bilaterally\par 		Tibialis anterior: 5/5 bilaterally\par 		Extensor hallucis longus: 5/5 bilaterally\par \par 		Sensation: normal and equal in bilateral lower extremities\par \par \par Extremity: no edema noted\par Neurological: Memory normal, AAO x 3, Cranial nerves II - XII grossly normal\par Psychiatric: Appropriate mood and affect, oriented to time, place, person, and situation\par Examination limited due to distancing due to outbreak of SARVS-CoV2\par \par \par

## 2020-08-15 ENCOUNTER — RESULT REVIEW (OUTPATIENT)
Age: 60
End: 2020-08-15

## 2020-08-18 ENCOUNTER — APPOINTMENT (OUTPATIENT)
Dept: PAIN MANAGEMENT | Facility: HOSPITAL | Age: 60
End: 2020-08-18

## 2020-08-18 ENCOUNTER — RESULT REVIEW (OUTPATIENT)
Age: 60
End: 2020-08-18

## 2020-08-31 ENCOUNTER — APPOINTMENT (OUTPATIENT)
Dept: PAIN MANAGEMENT | Facility: CLINIC | Age: 60
End: 2020-08-31

## 2020-08-31 VITALS
BODY MASS INDEX: 28.64 KG/M2 | HEIGHT: 68 IN | DIASTOLIC BLOOD PRESSURE: 80 MMHG | WEIGHT: 189 LBS | SYSTOLIC BLOOD PRESSURE: 120 MMHG | TEMPERATURE: 98.6 F

## 2020-08-31 NOTE — HISTORY OF PRESENT ILLNESS
[4] : 1. What number best describes your pain on average in the past week? 4/10 pain [3] : 2. What number best describes how, during the past week, pain has interfered with your enjoyment of life? 3/10 pain [2] : 3. What number best describes how, during the past week, pain has interfered with your general activity? 2/10 pain [FreeTextEntry2] : 10

## 2020-09-02 ENCOUNTER — APPOINTMENT (OUTPATIENT)
Dept: PAIN MANAGEMENT | Facility: CLINIC | Age: 60
End: 2020-09-02
Payer: COMMERCIAL

## 2020-09-02 PROCEDURE — 99443: CPT

## 2020-10-05 ENCOUNTER — APPOINTMENT (OUTPATIENT)
Dept: FAMILY MEDICINE | Facility: CLINIC | Age: 60
End: 2020-10-05
Payer: COMMERCIAL

## 2020-10-05 VITALS
TEMPERATURE: 98.2 F | BODY MASS INDEX: 28.06 KG/M2 | HEIGHT: 70 IN | WEIGHT: 196 LBS | DIASTOLIC BLOOD PRESSURE: 80 MMHG | SYSTOLIC BLOOD PRESSURE: 110 MMHG

## 2020-10-05 DIAGNOSIS — R42 DIZZINESS AND GIDDINESS: ICD-10-CM

## 2020-10-05 PROCEDURE — G0008: CPT

## 2020-10-05 PROCEDURE — 36415 COLL VENOUS BLD VENIPUNCTURE: CPT

## 2020-10-05 PROCEDURE — 90686 IIV4 VACC NO PRSV 0.5 ML IM: CPT

## 2020-10-05 PROCEDURE — 99213 OFFICE O/P EST LOW 20 MIN: CPT | Mod: 25

## 2020-10-05 PROCEDURE — G0442 ANNUAL ALCOHOL SCREEN 15 MIN: CPT | Mod: 59

## 2020-10-05 NOTE — PHYSICAL EXAM
[Thyroid Normal, No Nodules] : the thyroid was normal and there were no nodules present [No Edema] : there was no peripheral edema [Normal] : affect was normal and insight and judgment were intact [de-identified] : impacted earwax B/L

## 2020-10-05 NOTE — HISTORY OF PRESENT ILLNESS
[FreeTextEntry1] : Follow-up appt [de-identified] : Mr. ALEC GARCIA is a 60 year old male here today for f/u appt. Hx of prediabetes and elevated LFTs after adjustment of statin. No exercising and diet is fair. \par Chronic lower back pain in to his hip. Has received two epidural injections. \par

## 2020-10-05 NOTE — HEALTH RISK ASSESSMENT
[] : No [Yes] : Yes [2 - 4 times a month (2 pts)] : 2-4 times a month (2 points) [1 or 2 (0 pts)] : 1 or 2 (0 points) [Never (0 pts)] : Never (0 points) [No] : In the past 12 months have you used drugs other than those required for medical reasons? No [0] : 2) Feeling down, depressed, or hopeless: Not at all (0) [Audit-CScore] : 2 [de-identified] : none [de-identified] : fair [[id number]] : 0

## 2020-10-06 LAB
ALBUMIN SERPL ELPH-MCNC: 4.8 G/DL
ALP BLD-CCNC: 66 U/L
ALT SERPL-CCNC: 23 U/L
AST SERPL-CCNC: 30 U/L
BILIRUB DIRECT SERPL-MCNC: 0.1 MG/DL
BILIRUB INDIRECT SERPL-MCNC: 0.2 MG/DL
BILIRUB SERPL-MCNC: 0.3 MG/DL
CHOLEST SERPL-MCNC: 184 MG/DL
CHOLEST/HDLC SERPL: 5.6 RATIO
ESTIMATED AVERAGE GLUCOSE: 128 MG/DL
HBA1C MFR BLD HPLC: 6.1 %
HDLC SERPL-MCNC: 33 MG/DL
LDLC SERPL CALC-MCNC: 92 MG/DL
PROT SERPL-MCNC: 6.9 G/DL
TRIGL SERPL-MCNC: 294 MG/DL

## 2020-12-05 ENCOUNTER — NON-APPOINTMENT (OUTPATIENT)
Age: 60
End: 2020-12-05

## 2020-12-10 ENCOUNTER — NON-APPOINTMENT (OUTPATIENT)
Age: 60
End: 2020-12-10

## 2020-12-15 ENCOUNTER — APPOINTMENT (OUTPATIENT)
Dept: CARDIOLOGY | Facility: CLINIC | Age: 60
End: 2020-12-15
Payer: COMMERCIAL

## 2020-12-15 DIAGNOSIS — Z86.79 PERSONAL HISTORY OF OTHER DISEASES OF THE CIRCULATORY SYSTEM: ICD-10-CM

## 2020-12-15 DIAGNOSIS — M51.9 UNSPECIFIED THORACIC, THORACOLUMBAR AND LUMBOSACRAL INTERVERTEBRAL DISC DISORDER: ICD-10-CM

## 2020-12-15 PROCEDURE — 99072 ADDL SUPL MATRL&STAF TM PHE: CPT

## 2020-12-15 PROCEDURE — 99215 OFFICE O/P EST HI 40 MIN: CPT

## 2020-12-15 PROCEDURE — 93000 ELECTROCARDIOGRAM COMPLETE: CPT | Mod: NC

## 2020-12-16 PROBLEM — Z86.79 HISTORY OF CORONARY ATHEROSCLEROSIS: Status: RESOLVED | Noted: 2019-02-17 | Resolved: 2020-12-16

## 2020-12-16 PROBLEM — M51.9 LUMBAR DISC DISEASE: Status: RESOLVED | Noted: 2020-12-16 | Resolved: 2020-12-16

## 2020-12-16 NOTE — PHYSICAL EXAM
[Normal Conjunctiva] : the conjunctiva exhibited no abnormalities [Auscultation Breath Sounds / Voice Sounds] : lungs were clear to auscultation bilaterally [Heart Rate And Rhythm] : heart rate and rhythm were normal [Heart Sounds] : normal S1 and S2 [Murmurs] : no murmurs present [Arterial Pulses Normal] : the arterial pulses were normal [Bowel Sounds] : normal bowel sounds [Abdomen Soft] : soft [Abdomen Tenderness] : non-tender [Abnormal Walk] : normal gait [] : no rash [Skin Lesions] : no skin lesions [Affect] : the affect was normal [FreeTextEntry1] : pleasant

## 2020-12-16 NOTE — HISTORY OF PRESENT ILLNESS
[FreeTextEntry1] : 60-year-old man\par Preoperative cardiovascular semination requested by Dr. Uribe prior to oral surgery.\par \par Dental implant surgery has been recommended by Dr. Uribe . The procedure is planned for 12/21/20 under intravenous sedation.\par \par \par Mr. Carranza denies chest pain, palpitations, shortness of breath or syncope.\par \par Tucker presents today for cardiology clearance prior to the procedure the

## 2020-12-16 NOTE — DISCUSSION/SUMMARY
[FreeTextEntry1] : Preoperative cardiovascular examination\par \par Dental implant surgery has been recommended by Dr. Uribe . The procedure is planned for 12/21/20 under intravenous sedation.\par \par Comment\par There is no cardiac contraindication to oral surgery. There has been no recent myocardial infarction, the patient is hemodynamically stable without cardiac related symptoms. There is no evidence of congestive heart failure. The preoperative 12/15/20 electrocardiogram is unchanged from prior tracings. As such, the operation may proceed with an acceptable cardiac risk.\par \par I have recommended the following\par a. Continue beta blockers (metoprolol) without interruption in the alexander-operative time period\par b. Further cardiac testing is not required at this time\par c. Workup and treatment as directed by Dr. Uribe\par d. Call if any perioperative cardiovascular issues arise.\par \par \par \par \par \par Atherosclerotic heart disease\par Mr. Carranza has known atherosclerotic heart disease. Although details are not available he reportedly had a myocardial infarction in 1999 requiring coronary stenting. An additional coronary stent was required in 2009. The details angiographic reports noninvasive studies etc. are all not presently available for review.\par \par In view of the lack of symptoms and clinical course continued medical management is indicated at this time. Review of prior cardiac records not available at this time would be helpful for management decisions.\par \par I have recommended the following:\par 1 risk factor modification\par 2 continue present medical regimen\par 3 previous cardiac records including angiographic reports, cardiology notes stress test laboratory studies etc. are  (again) requested for review as discussed above. \par 4. Further cardiac evaluation and treatment will be dependent on the review of the above data\par \par \par \par \par Hypertension\par Hypertension has reportedly been controlled on the present medical regimen. In the setting of atherosclerotic heart disease and previous myocardial infarction beta blocker and ACE-I./ARB therapy  are attractive.. The doses of both valsartan and metoprolol (with monitoring for bradycardia) may be increased if required to maintain optimal levels.\par \par I have recommended the following:\par 1 low-salt low-fat low-cholesterol diet. Regular aerobic exercise\par 2 continue present medical regimen\par 3 increase valsartan and/or metoprolol   (with monitoring for bradycardia) if required to maintain optimal levels as discussed above\par \par \par Hyperlipidemia\par Hyperlipidemia represents a risk factor for progressive atherosclerotic disease. The target LDL level with known atherosclerotic heart disease is about 70.  HMG co A reductase inhibitor therapy has been effective. In 10/20 the serum cholesterol level was 184 triglycerides 294 HDL 33 and LDL 92. The dose of simvastatin may be increased or changed to a high intensity statin (Crestor )  if required to obtain optimal levels. Nonpharmacological therapy, specifically diet and exercise are emphasized  as major aspects of treatment.\par \par I have  recommended the following:\par 1 low-salt low-fat low-cholesterol diet. Regular aerobic exercise\par 2 continue  the present medical regimen\par 3 routine laboratories data including lipid profile through primary care Dr. Aldana\par 4 target LDL level to about 70 as discussed above\par 5 increase simvastatin dose or change to high intensity statin  ( rosuvastatin ) if required to obtain optimal levels as discussed above. \par \par \par Obesity\par Obesity exacerbates Mr. Carranza's cardiovascular issues. Today Tucker is 5 feet 8 inches tall and weighs 192 pounds. Diet exercise and weight loss are advised.\par \par \par \par The diagnosis, prognosis, risks, options and alternatives were explained at length to the patient. All questions were answered. Issues discussed included anesthesia atherosclerotic heart disease risk factor modification hypertension hyperlipidemia diet and exercise.\par \par Counseling and/or coordination of care\par Time was a significant factor for this patient encounter. Total time spent with the patient was 40 minutes. 50% of the time was devoted to counseling and/or coordination of care

## 2020-12-17 ENCOUNTER — APPOINTMENT (OUTPATIENT)
Dept: CARDIOLOGY | Facility: CLINIC | Age: 60
End: 2020-12-17

## 2021-03-31 DIAGNOSIS — L21.9 SEBORRHEIC DERMATITIS, UNSPECIFIED: ICD-10-CM

## 2021-04-19 ENCOUNTER — APPOINTMENT (OUTPATIENT)
Dept: PAIN MANAGEMENT | Facility: CLINIC | Age: 61
End: 2021-04-19
Payer: COMMERCIAL

## 2021-04-19 VITALS
WEIGHT: 196 LBS | SYSTOLIC BLOOD PRESSURE: 132 MMHG | BODY MASS INDEX: 28.06 KG/M2 | DIASTOLIC BLOOD PRESSURE: 82 MMHG | HEIGHT: 70 IN | TEMPERATURE: 98 F

## 2021-04-19 DIAGNOSIS — M54.16 RADICULOPATHY, LUMBAR REGION: ICD-10-CM

## 2021-04-19 PROCEDURE — 99072 ADDL SUPL MATRL&STAF TM PHE: CPT

## 2021-04-19 PROCEDURE — 99214 OFFICE O/P EST MOD 30 MIN: CPT

## 2021-04-20 PROBLEM — M54.16 LUMBAR RADICULOPATHY, CHRONIC: Status: ACTIVE | Noted: 2020-03-26

## 2021-04-20 NOTE — DATA REVIEWED
Physician Documentation                                                                           

 [facility]                                                                 

Name: Hossein Muniz                                                                               

Age: 6 yrs                                                                                        

Sex: Female                                                                                       

: 2012                                                                                   

MRN: [id number]                                                                                   

Arrival Date: 2019                                                                          

Time: 20:53                                                                                       

Account#: [id number]                                                                            

Bed 28                                                                                            

Private MD: Sarah Gomez ED Physician Glen Kearney                                                                       

HPI:                                                                                              

                                                                                             

22:28 This 6 yrs old  Female presents to ER via Ambulatory with complaints of Ear    snw 

      Pain, Drainage From Ear.                                                                    

22:28 The patient presents with drainage, a fullness, pain, swelling, tenderness. The         snw 

      complaints affect the right ear. Onset: The symptoms/episode began/occurred suddenly, 2     

      day(s) ago, and became worse and became persistent. Associated signs and symptoms:          

      Pertinent positives: pain to right ear, fever. Severity of symptoms: At their worst the     

      symptoms were moderate severe. The patient has not experienced similar symptoms in the      

      past. The patient has not recently seen a physician. swimming a lot.                        

                                                                                                  

Historical:                                                                                       

- Allergies:                                                                                      

21:15 No Known Allergies;                                                                     ak1 

- Home Meds:                                                                                      

21:15 None [Active];                                                                          ak1 

- PMHx:                                                                                           

21:15 None;                                                                                   ak1 

- PSHx:                                                                                           

21:15 None;                                                                                   ak1 

                                                                                                  

- Immunization history:: Childhood immunizations are up to date.                                  

- Ebola Screening: : No symptoms or risks identified at this time.                                

                                                                                                  

                                                                                                  

ROS:                                                                                              

22:28 Constitutional: Negative for fever, chills, and weight loss, Eyes: Negative for injury, snw 

      pain, redness, and discharge, Neck: Negative for injury, pain, and swelling,                

      Cardiovascular: Negative for chest pain, palpitations, and edema, Respiratory: Negative     

      for shortness of breath, cough, wheezing, and pleuritic chest pain, Abdomen/GI:             

      Negative for abdominal pain, nausea, vomiting, diarrhea, and constipation, Back:            

      Negative for injury and pain, : Negative for injury, bleeding, discharge, and             

      swelling, MS/Extremity: Negative for injury and deformity, Skin: Negative for injury,       

      rash, and discoloration, Neuro: Negative for headache, weakness, numbness, tingling,        

      and seizure.                                                                                

22:28 ENT: Positive for ear pain.                                                                 

                                                                                                  

Exam:                                                                                             

22:25 Constitutional:  Well developed, well nourished child who is awake, alert and           snw 

      cooperative in no acute distress. + fever, + pain to right ear Head/Face:                   

      Normocephalic, atraumatic. Eyes:  Pupils equal round and reactive to light,                 

      extra-ocular motions intact.  Lids and lashes normal.  Conjunctiva and sclera are           

      non-icteric and not injected.  Cornea within normal limits.  Periorbital areas with no      

      swelling, redness, or edema. Neck:  Trachea midline, no thyromegaly or masses palpated,     

      and no cervical lymphadenopathy.  Supple, full range of motion without nuchal rigidity,     

      or vertebral point tenderness.  No Meningismus. Chest/axilla:  Normal symmetrical           

      motion.  No tenderness.  No crepitus.  No axillary masses or tenderness.                    

      Cardiovascular:  Regular rate and rhythm with a normal S1 and S2.  No gallops, murmurs,     

      or rubs.  Normal PMI, no JVD.  No pulse deficits. Respiratory:  Lungs have equal breath     

      sounds bilaterally, clear to auscultation and percussion.  No rales, rhonchi or wheezes     

      noted.  No increased work of breathing, no retractions or nasal flaring. Abdomen/GI:        

      Soft, non-tender with normal bowel sounds.  No distension, tympany or bruits.  No           

      guarding, rebound or rigidity.  No palpable masses or evidence of tenderness with           

      thorough palpation. Back:  No spinal tenderness.  No costovertebral tenderness.  Full       

      range of motion. Skin:  Warm and dry with excellent turgor.  capillary refill <2            

      seconds.  No cyanosis, pallor, rash or edema.                                               

22:25 ENT: External ear(s): pain with movement, that is moderate, of the  pinna of right ear      

      and right preauricular area, Ear canal(s): purulent discharge, swelling, that is            

      moderate, of the right canal, TM's: not visable, because of discharge, Examination of       

      the other ear shows no obvious abnormality, Nose: is normal, Mouth: is normal,              

      Posterior pharynx: is normal, Voice: is normal, post auricular area with erythema to        

      mastoid area, tender with movement, ear with outward, downward positioning secondary to     

      infection.                                                                                  

                                                                                                  

Vital Signs:                                                                                      

21:15 Pulse 110; Resp 20; Temp 100.1(TE); Pulse Ox 97% on R/A; Weight 25.36 kg (M);           ak1 

22:55 Pulse 118; Resp 20 S; Temp 99.2(O); Pulse Ox 100% on R/A;                               cc3 

21:15 oral was 97.6                                                                           ak1 

                                                                                                  

MDM:                                                                                              

21:58 Patient medically screened.                                                             snw 

22:30 Data reviewed: vital signs, nurses notes. Data interpreted: Pulse oximetry: on room air snw 

      is 97 %. Interpretation: normal. Counseling: I had a detailed discussion with the           

      patient and/or guardian regarding: the historical points, exam findings, and any            

      diagnostic results supporting the discharge/admit diagnosis, the need for outpatient        

      follow up, to return to the emergency department if symptoms worsen or persist or if        

      there are any questions or concerns that arise at home. Response to treatment: the          

      patient's symptoms have mildly improved after treatment. Special discussion: I              

      discussed in detail with the patient the higher chance of wound infection based on his      

      presenting history. Based on the history and exam findings, there is no indication for      

      further emergent testing or inpatient evaluation. I discussed with the patient/guardian     

      the need to see the ENT specialist for further evaluation of the symptoms. I discussed      

      with the patient/guardian the need to see the pediatrician for further evaluation of        

      the symptoms.                                                                               

                                                                                                  

Administered Medications:                                                                         

22:40 Drug: Lortab Liquid 5 ml Route: PO;                                                     cc3 

23:00 Follow up: Response: No adverse reaction; Pain is decreased                             cc3 

22:41 Drug: Cortisporin Drops 4 drops Route: Otic; Site: right ear;                           cc3 

23:00 Follow up: Response: No adverse reaction                                                cc3 

22:45 Drug: Rocephin (cefTRIAXone) 50 mg/kg Route: IM; Site: left gluteus;                    cc3 

23:00 Follow up: Response: No adverse reaction                                                cc3 

                                                                                                  

                                                                                                  

Disposition:                                                                                      

                                                                                             

01:48 Co-signature as Attending Physician, Glen Kearney MD.                                    

                                                                                                  

Disposition:                                                                                      

19 22:19 Discharged to Home. Impression: Acute contact otitis externa, Mastoiditis and      

  related conditions.                                                                             

- Condition is Stable.                                                                            

- Discharge Instructions: Ibuprofen Dosage Chart, Pediatric, Otitis Externa,                      

  Mastoiditis, Pediatric, Heat Therapy.                                                           

- Prescriptions for cefdinir 250 mg/5 mL Oral suspension for reconstitution - take 7              

  milliliter by ORAL route once daily for 14 days; 100 milliliter. Ciprodex 0.3- 0.1 %            

  Otic Drops, Suspension - instill 4 drop by OTIC route every 12 hours for 7 days , for           

  ears ONLY; 1 Container.                                                                         

- Medication Reconciliation Form, Thank You Letter, Antibiotic Education, Prescription            

  Opioid Use form.                                                                                

- Follow up: Sarah Gomez MD; When: 1 - 2 days; Reason: Recheck today's                 

  complaints, Continuance of care, Re-evaluation by your physician. Follow up:                    

  Emergency Department; When: As needed; Reason: Worsening of condition.                          

- Notes: No swimming x 2 weeks                                                                    

                                                                                                  

                                                                                                  

Signatures:                                                                                       

Stefani Le, BATSHEVAP-C                 FNP-Csnw                                                  

Jaclyn Gilmore, RN                       RN   ak1                                                  

Glen Kearney MD MD                                                      

Mikki Koenig                             cc3                                                  

                                                                                                  

Corrections: (The following items were deleted from the chart)                                    

                                                                                             

23:03 22:19 2019 22:19 Discharged to Home. Impression: Acute contact otitis externa;    cc3 

      Mastoiditis and related conditions. Condition is Stable. Forms are Medication               

      Reconciliation Form, Thank You Letter, Antibiotic Education, Prescription Opioid Use.       

      Follow up: Sarah Gomez; When: 1 - 2 days; Reason: Recheck today's complaints,      

      Continuance of care, Re-evaluation by your physician. Follow up: Emergency Department;      

      When: As needed; Reason: Worsening of condition. snw                                        

                                                                                                  

************************************************************************************************** [FreeTextEntry1] : MRI LUMBAR SPINE		(03/15/20):			\par \par There is a lumbar levoscoliosis with apex at L2-L3. There is mild straightening of the lumbar lordosis. Lumbar vertebral body heights are preserved; there is no compression deformity. There is no marrow signal abnormality; specifically, there is no marrow edema.\par \par There is multilevel disc degeneration with loss of disc space height.\par \par At T12-L1: Minimal disc bulge flattening the ventral thecal sac. Mild facet and ligamentous hypertrophy contributing to mild segmental canal stenosis. Moderate left neural foraminal narrowing secondary to facet hypertrophy.\par \par At L1-L2: Mild disc bulge lateralized to the left and moderate facet and mild ligamentous hypertrophy contributing to mild segmental canal stenosis and mild left lateral recess stenosis. Moderate left neural foraminal narrowing secondary to facet hypertrophy and lateralized disc bulge.\par \par At L2-L3: Mild disc bulge flattening the ventral thecal sac with combination of moderate facet and mild ligamentous hypertrophy resulting in mild segmental canal stenosis. No significant neural foraminal narrowing.\par \par At L3-L4: Mild disc bulge and combination of moderate facet and ligamentous hypertrophy resulting in moderate segmental canal stenosis and mild right lateral recess stenosis. Moderate to severe right neural foraminal narrowing.\par \par At L4-L5: Mild disc bulge and combination of moderate facet and ligamentous hypertrophy resulting in mild segmental canal stenosis and mild left lateral recess stenosis. Moderate left and mild right neural foraminal narrowing.\par \par At L5-S1: Mild disc bulge lateralized to the left with combination of moderate facet and mild ligamentous hypertrophy resulting in mild segmental canal stenosis and moderate left lateral recess stenosis. Moderate to severe left neural foraminal narrowing and mild right neural foraminal narrowing.\par \par The conus medullaris terminates at L1. There is no signal abnormality within the visualized thoracic cord. There is no evidence of an intraspinal mass or epidural collection. There is no paraspinal soft tissue abnormality. The visualized portions of the abdomen and pelvis are grossly unremarkable.\par \par \par \par \par IMPRESSION:\par \par Multilevel lumbar spondylosis, as described. Moderate segmental canal stenosis at L3-L4. Moderate left lateral recess stenosis at L5-S1. Mild right lateral recess stenosis at L3-L4. Mild left lateral recess stenosis at L1-L2 and L4-L5. Multilevel neural foraminal narrowing, most severe on the right at L3-L4 and on the left at L5-S1.\par

## 2021-04-20 NOTE — HISTORY OF PRESENT ILLNESS
[___ mths] : [unfilled] month(s) ago [7] : a maximum pain level of 7/10 [Burning] : burning [Shooting] : shooting [Standing] : standing [Rest] : rest [5] : 1. What number best describes your pain on average in the past week? 5/10 pain [2] : 2. What number best describes how, during the past week, pain has interfered with your enjoyment of life? 2/10 pain [3] : 3. What number best describes how, during the past week, pain has interfered with your general activity? 3/10 pain [FreeTextEntry1] : Interval History:\par Patient returns for follow-up today. He reports almost 8 months of complete pain relief after recent MARIXA. His pain has recently returned. No relief w/ medications. No relief w/ HEP, he is using a . Wishes for intervention. \par \par As per my note dated 03/26/20: "59 yom presents w/ one year of left-sided low back pain. Pain began about a year ago and was manageable, however, the severity did make the patient have to stop going to the gym and doing yoga. Then, approximately one month ago, the pain worsened. Pain now radiates on occasion down the posterior aspect of the left lower extremity. Right leg is within normal limits. He does have periods of time with no pain, including on a recent safari. He has used Aleve and just started gabapentin this week. No other recent changes in health."\par \par As per my note dated, 07/30/20. "He had significant relief from his MARIXA. Pain remains. Pain is worst at night. He has seen Dr. Gentile who advised against surgery and to consider repeat MARIXA at this time."\par \par Interventions:\par Left L5-S1 TFESI (08/18/20):\par L5-S1 interlaminar MARIXA (05/26/20): [FreeTextEntry7] : rt side lower back [FreeTextEntry2] : 10

## 2021-04-20 NOTE — ASSESSMENT
[FreeTextEntry1] : 60 yom presents w/ chronic left-sided low back pain that radiates down the posterior aspect of the left lower extremity. He had almost eight months of complete relief from recent AMRIXA.  I have again personally reviewed the patient's MRI in detail and discussed it with them which is significant for moderate to severe left foraminal stenosis at L5-S1, and symptoms are most consistent w/ S1 radiculopathy. The patient has failed to have relief with medication management and physical therapy. Given the patients failure to improve with all other conservative measures, recommend left L5-S1 transforaminal epidural steroid injection under fluoroscopic guidance. NSAIDS prn. \par

## 2021-04-20 NOTE — PHYSICAL EXAM
[de-identified] : Constitutional: Well-developed, in no acute distress\par Eyes: Pupil- Right: normal, Left: normal\par Neck exam: Inspection normal\par Respiratory: Normal effort, speaking in full sentences\par Vascular: no edema noted\par Abdomen: Inspection normal, no distension\par Skin: Inspection normal, no bruising noted\par Musculoskeletal:\par Lumbar Spine:   Gait: normal\par 		Muscle Strength:\par 		Iliopsoas: 5/5 bilaterally\par 		Quadriceps: 5/5 bilaterally\par 		Hamstrings: 5/5 bilaterally\par 		Tibialis anterior: 5/5 bilaterally\par 		Extensor hallucis longus: 5/5 bilaterally\par \par 		Sensation: normal and equal in bilateral lower extremities\par \par \par Extremity: no edema noted\par Neurological: Memory normal, AAO x 3, Cranial nerves II - XII grossly normal\par Psychiatric: Appropriate mood and affect, oriented to time, place, person, and situation\par Examination limited due to distancing due to outbreak of SARVS-CoV2\par \par \par

## 2021-04-24 ENCOUNTER — RESULT REVIEW (OUTPATIENT)
Age: 61
End: 2021-04-24

## 2021-04-26 ENCOUNTER — RX RENEWAL (OUTPATIENT)
Age: 61
End: 2021-04-26

## 2021-04-27 ENCOUNTER — APPOINTMENT (OUTPATIENT)
Dept: PAIN MANAGEMENT | Facility: HOSPITAL | Age: 61
End: 2021-04-27

## 2021-04-27 ENCOUNTER — RX RENEWAL (OUTPATIENT)
Age: 61
End: 2021-04-27

## 2021-04-27 ENCOUNTER — RESULT REVIEW (OUTPATIENT)
Age: 61
End: 2021-04-27

## 2021-06-22 ENCOUNTER — RESULT REVIEW (OUTPATIENT)
Age: 61
End: 2021-06-22

## 2021-06-22 ENCOUNTER — APPOINTMENT (OUTPATIENT)
Dept: PAIN MANAGEMENT | Facility: HOSPITAL | Age: 61
End: 2021-06-22

## 2021-07-23 RX ORDER — SIMVASTATIN 80 MG/1
TABLET, FILM COATED ORAL
Refills: 0 | Status: DISCONTINUED | COMMUNITY
End: 2021-07-23

## 2021-07-23 RX ORDER — OXYCODONE AND ACETAMINOPHEN 10; 325 MG/1; MG/1
10-325 TABLET ORAL
Qty: 20 | Refills: 0 | Status: DISCONTINUED | COMMUNITY
Start: 2020-12-17 | End: 2021-07-23

## 2021-07-23 RX ORDER — GABAPENTIN 300 MG/1
300 CAPSULE ORAL
Qty: 90 | Refills: 5 | Status: DISCONTINUED | COMMUNITY
Start: 2021-04-07 | End: 2021-07-23

## 2021-07-23 RX ORDER — IBUPROFEN 600 MG/1
600 TABLET, FILM COATED ORAL
Qty: 40 | Refills: 0 | Status: DISCONTINUED | COMMUNITY
Start: 2020-12-17 | End: 2021-07-23

## 2021-07-23 RX ORDER — ZOLPIDEM TARTRATE 10 MG/1
10 TABLET ORAL
Qty: 30 | Refills: 0 | Status: DISCONTINUED | COMMUNITY
Start: 2019-04-17 | End: 2021-07-23

## 2021-07-23 RX ORDER — NORTRIPTYLINE HYDROCHLORIDE 10 MG/1
10 CAPSULE ORAL
Qty: 60 | Refills: 1 | Status: DISCONTINUED | COMMUNITY
Start: 2021-04-07 | End: 2021-07-23

## 2021-07-23 RX ORDER — AMOXICILLIN 500 MG/1
500 TABLET, FILM COATED ORAL
Qty: 40 | Refills: 0 | Status: DISCONTINUED | COMMUNITY
Start: 2020-12-17 | End: 2021-07-23

## 2021-07-23 RX ORDER — CHLORHEXIDINE GLUCONATE, 0.12% ORAL RINSE 1.2 MG/ML
0.12 SOLUTION DENTAL
Qty: 473 | Refills: 0 | Status: DISCONTINUED | COMMUNITY
Start: 2020-12-17 | End: 2021-07-23

## 2021-08-07 ENCOUNTER — APPOINTMENT (OUTPATIENT)
Dept: FAMILY MEDICINE | Facility: CLINIC | Age: 61
End: 2021-08-07
Payer: COMMERCIAL

## 2021-08-07 VITALS
BODY MASS INDEX: 27.2 KG/M2 | WEIGHT: 190 LBS | HEIGHT: 70 IN | SYSTOLIC BLOOD PRESSURE: 120 MMHG | DIASTOLIC BLOOD PRESSURE: 80 MMHG | TEMPERATURE: 98 F

## 2021-08-07 VITALS — DIASTOLIC BLOOD PRESSURE: 76 MMHG | SYSTOLIC BLOOD PRESSURE: 110 MMHG

## 2021-08-07 DIAGNOSIS — F40.243 FEAR OF FLYING: ICD-10-CM

## 2021-08-07 DIAGNOSIS — Z86.39 PERSONAL HISTORY OF OTHER ENDOCRINE, NUTRITIONAL AND METABOLIC DISEASE: ICD-10-CM

## 2021-08-07 PROCEDURE — 36415 COLL VENOUS BLD VENIPUNCTURE: CPT

## 2021-08-07 PROCEDURE — G0444 DEPRESSION SCREEN ANNUAL: CPT | Mod: NC,59

## 2021-08-07 PROCEDURE — 99396 PREV VISIT EST AGE 40-64: CPT | Mod: 25

## 2021-08-07 NOTE — REVIEW OF SYSTEMS
[Recent Change In Weight] : ~T recent weight change [Negative] : Psychiatric [Vision Problems] : no vision problems [FreeTextEntry9] : occasional left hip pain

## 2021-08-07 NOTE — HISTORY OF PRESENT ILLNESS
[FreeTextEntry1] : Annual  [de-identified] : Mr. ALEC GARCIA is a 61 year old male here today for his annual examination.\par Will be traveling and would like some medication to help with anxiety with flying and to help him sleep overseas. Has used xanax in the past. Uses very infrequently.\par Feels well overall\par Working out in the gym. Has lost some weight.\par

## 2021-08-07 NOTE — PHYSICAL EXAM
[Thyroid Normal, No Nodules] : the thyroid was normal and there were no nodules present [Clear to Auscultation] : lungs were clear to auscultation bilaterally [No Edema] : there was no peripheral edema [Normal] : affect was normal and insight and judgment were intact [de-identified] : impacted cerumen bilaterally

## 2021-08-08 LAB
25(OH)D3 SERPL-MCNC: 16.3 NG/ML
ALBUMIN SERPL ELPH-MCNC: 5 G/DL
ALP BLD-CCNC: 72 U/L
ALT SERPL-CCNC: 25 U/L
ANION GAP SERPL CALC-SCNC: 13 MMOL/L
APPEARANCE: CLEAR
AST SERPL-CCNC: 24 U/L
BASOPHILS # BLD AUTO: 0.06 K/UL
BASOPHILS NFR BLD AUTO: 1.2 %
BILIRUB SERPL-MCNC: 0.5 MG/DL
BILIRUBIN URINE: NEGATIVE
BLOOD URINE: NEGATIVE
BUN SERPL-MCNC: 16 MG/DL
CALCIUM SERPL-MCNC: 10.3 MG/DL
CHLORIDE SERPL-SCNC: 104 MMOL/L
CHOLEST SERPL-MCNC: 173 MG/DL
CO2 SERPL-SCNC: 23 MMOL/L
COLOR: NORMAL
CREAT SERPL-MCNC: 1.01 MG/DL
EOSINOPHIL # BLD AUTO: 0.18 K/UL
EOSINOPHIL NFR BLD AUTO: 3.7 %
ESTIMATED AVERAGE GLUCOSE: 128 MG/DL
GLUCOSE QUALITATIVE U: NEGATIVE
GLUCOSE SERPL-MCNC: 97 MG/DL
HBA1C MFR BLD HPLC: 6.1 %
HCT VFR BLD CALC: 47.7 %
HDLC SERPL-MCNC: 37 MG/DL
HGB BLD-MCNC: 15.3 G/DL
IMM GRANULOCYTES NFR BLD AUTO: 0.2 %
KETONES URINE: NEGATIVE
LDLC SERPL CALC-MCNC: 86 MG/DL
LEUKOCYTE ESTERASE URINE: NEGATIVE
LYMPHOCYTES # BLD AUTO: 1.58 K/UL
LYMPHOCYTES NFR BLD AUTO: 32.4 %
MAN DIFF?: NORMAL
MCHC RBC-ENTMCNC: 28.6 PG
MCHC RBC-ENTMCNC: 32.1 GM/DL
MCV RBC AUTO: 89.2 FL
MONOCYTES # BLD AUTO: 0.53 K/UL
MONOCYTES NFR BLD AUTO: 10.9 %
NEUTROPHILS # BLD AUTO: 2.51 K/UL
NEUTROPHILS NFR BLD AUTO: 51.6 %
NITRITE URINE: NEGATIVE
NONHDLC SERPL-MCNC: 137 MG/DL
PH URINE: 6
PLATELET # BLD AUTO: 233 K/UL
POTASSIUM SERPL-SCNC: 4.5 MMOL/L
PROT SERPL-MCNC: 7.2 G/DL
PROTEIN URINE: NEGATIVE
PSA SERPL-MCNC: 0.24 NG/ML
RBC # BLD: 5.35 M/UL
RBC # FLD: 14.9 %
SODIUM SERPL-SCNC: 140 MMOL/L
SPECIFIC GRAVITY URINE: 1.02
T4 SERPL-MCNC: 6.2 UG/DL
TRIGL SERPL-MCNC: 253 MG/DL
TSH SERPL-ACNC: 2.79 UIU/ML
UROBILINOGEN URINE: NORMAL
WBC # FLD AUTO: 4.87 K/UL

## 2021-11-05 ENCOUNTER — RX RENEWAL (OUTPATIENT)
Age: 61
End: 2021-11-05

## 2021-12-09 ENCOUNTER — RX RENEWAL (OUTPATIENT)
Age: 61
End: 2021-12-09

## 2022-01-10 ENCOUNTER — APPOINTMENT (OUTPATIENT)
Dept: FAMILY MEDICINE | Facility: CLINIC | Age: 62
End: 2022-01-10
Payer: COMMERCIAL

## 2022-01-10 ENCOUNTER — MED ADMIN CHARGE (OUTPATIENT)
Age: 62
End: 2022-01-10

## 2022-01-10 PROCEDURE — 90686 IIV4 VACC NO PRSV 0.5 ML IM: CPT

## 2022-01-10 PROCEDURE — G0008: CPT

## 2022-01-19 ENCOUNTER — RX RENEWAL (OUTPATIENT)
Age: 62
End: 2022-01-19

## 2022-04-06 ENCOUNTER — TRANSCRIPTION ENCOUNTER (OUTPATIENT)
Age: 62
End: 2022-04-06

## 2022-04-06 ENCOUNTER — NON-APPOINTMENT (OUTPATIENT)
Age: 62
End: 2022-04-06

## 2022-04-09 ENCOUNTER — APPOINTMENT (OUTPATIENT)
Age: 62
End: 2022-04-09

## 2022-06-22 ENCOUNTER — NON-APPOINTMENT (OUTPATIENT)
Age: 62
End: 2022-06-22

## 2022-09-24 ENCOUNTER — NON-APPOINTMENT (OUTPATIENT)
Age: 62
End: 2022-09-24

## 2022-10-05 ENCOUNTER — APPOINTMENT (OUTPATIENT)
Dept: FAMILY MEDICINE | Facility: CLINIC | Age: 62
End: 2022-10-05

## 2022-10-05 VITALS
HEIGHT: 70 IN | DIASTOLIC BLOOD PRESSURE: 88 MMHG | BODY MASS INDEX: 27.2 KG/M2 | WEIGHT: 190 LBS | HEART RATE: 68 BPM | SYSTOLIC BLOOD PRESSURE: 130 MMHG

## 2022-10-05 VITALS — SYSTOLIC BLOOD PRESSURE: 128 MMHG | DIASTOLIC BLOOD PRESSURE: 80 MMHG

## 2022-10-05 DIAGNOSIS — Z23 ENCOUNTER FOR IMMUNIZATION: ICD-10-CM

## 2022-10-05 DIAGNOSIS — Z00.00 ENCOUNTER FOR GENERAL ADULT MEDICAL EXAMINATION W/OUT ABNORMAL FINDINGS: ICD-10-CM

## 2022-10-05 DIAGNOSIS — H61.23 IMPACTED CERUMEN, BILATERAL: ICD-10-CM

## 2022-10-05 PROCEDURE — 99396 PREV VISIT EST AGE 40-64: CPT | Mod: 25

## 2022-10-05 PROCEDURE — 90682 RIV4 VACC RECOMBINANT DNA IM: CPT

## 2022-10-05 PROCEDURE — 36415 COLL VENOUS BLD VENIPUNCTURE: CPT

## 2022-10-05 PROCEDURE — 93000 ELECTROCARDIOGRAM COMPLETE: CPT | Mod: 59

## 2022-10-05 PROCEDURE — G0008: CPT

## 2022-10-05 PROCEDURE — G0444 DEPRESSION SCREEN ANNUAL: CPT | Mod: 59

## 2022-10-05 RX ORDER — HYDROCODONE BITARTRATE AND HOMATROPINE METHYLBROMIDE 1.5; 5 MG/5ML; MG/5ML
5-1.5 SOLUTION ORAL
Qty: 180 | Refills: 0 | Status: DISCONTINUED | COMMUNITY
Start: 2022-04-06 | End: 2022-10-05

## 2022-10-05 RX ORDER — LEVOTHYROXINE SODIUM 0.1 MG/1
100 TABLET ORAL DAILY
Qty: 90 | Refills: 3 | Status: DISCONTINUED | COMMUNITY
End: 2022-10-05

## 2022-10-05 RX ORDER — TRAZODONE HYDROCHLORIDE 50 MG/1
50 TABLET ORAL
Qty: 30 | Refills: 1 | Status: DISCONTINUED | COMMUNITY
Start: 2020-12-05 | End: 2022-10-05

## 2022-10-05 RX ORDER — ERGOCALCIFEROL 1.25 MG/1
1.25 MG CAPSULE ORAL
Qty: 4 | Refills: 2 | Status: DISCONTINUED | COMMUNITY
Start: 2021-08-09 | End: 2022-10-05

## 2022-10-05 RX ORDER — AZITHROMYCIN 250 MG/1
250 TABLET, FILM COATED ORAL
Qty: 12 | Refills: 0 | Status: DISCONTINUED | COMMUNITY
Start: 2022-06-22 | End: 2022-10-05

## 2022-10-05 RX ORDER — METOPROLOL SUCCINATE 50 MG/1
50 TABLET, EXTENDED RELEASE ORAL
Qty: 90 | Refills: 3 | Status: DISCONTINUED | COMMUNITY
Start: 2019-11-19 | End: 2022-10-05

## 2022-10-05 RX ORDER — TRIAMCINOLONE ACETONIDE 1 MG/G
0.1 CREAM TOPICAL
Qty: 30 | Refills: 2 | Status: DISCONTINUED | COMMUNITY
Start: 2021-03-31 | End: 2022-10-05

## 2022-10-05 NOTE — HISTORY OF PRESENT ILLNESS
[FreeTextEntry1] : Annual examination. [de-identified] : Mr. ALEC GARCIA is a 62 year old male here today for his annual\par Hx of CAD- has noticed some increased SOB with brisk walking. Has been working out with a  and has noticed SOB as well. No chest pain\par Seeing cardiologist tomorrow.\par

## 2022-10-05 NOTE — HEALTH RISK ASSESSMENT
[Good] : ~his/her~ current health as good [Very Good] : ~his/her~  mood as very good [Former] : Former [Yes] : Yes [2 - 4 times a month (2 pts)] : 2-4 times a month (2 points) [1 or 2 (0 pts)] : 1 or 2 (0 points) [Never (0 pts)] : Never (0 points) [No] : In the past 12 months have you used drugs other than those required for medical reasons? No [No falls in past year] : Patient reported no falls in the past year [0] : 2) Feeling down, depressed, or hopeless: Not at all (0) [PHQ-2 Negative - No further assessment needed] : PHQ-2 Negative - No further assessment needed [No Retinopathy] : No retinopathy [Patient reported colonoscopy was normal] : Patient reported colonoscopy was normal [HIV test declined] : HIV test declined [Hepatitis C test declined] : Hepatitis C test declined [None] : None [With Significant Other] : lives with significant other [# of Members in Household ___] :  household currently consist of [unfilled] member(s) [College] : College [] :  [# Of Children ___] : has [unfilled] children [Sexually Active] : sexually active [Reports normal functional visual acuity (ie: able to read med bottle)] : Reports normal functional visual acuity [Smoke Detector] : smoke detector [Carbon Monoxide Detector] : carbon monoxide detector [Seat Belt] :  uses seat belt [Sunscreen] : uses sunscreen [With Patient/Caregiver] : , with patient/caregiver [DNR] : DNR [Retired] : retired [Fully functional (bathing, dressing, toileting, transferring, walking, feeding)] : Fully functional (bathing, dressing, toileting, transferring, walking, feeding) [Fully functional (using the telephone, shopping, preparing meals, housekeeping, doing laundry, using] : Fully functional and needs no help or supervision to perform IADLs (using the telephone, shopping, preparing meals, housekeeping, doing laundry, using transportation, managing medications and managing finances) [Reviewed no changes] : Reviewed, no changes [Designated Healthcare Proxy] : Designated healthcare proxy [Name: ___] : Health Care Proxy's Name: [unfilled]  [Relationship: ___] : Relationship: [unfilled] [FreeTextEntry1] : right lower back and buttock [YearQuit] : 1994 [de-identified] : socially  [Audit-CScore] : 2 [de-identified] : Works out with  twice a week; walks dog every day [de-identified] : Fair [[id number]] : 0 [EyeExamDate] : 06/21 [Change in mental status noted] : No change in mental status noted [High Risk Behavior] : no high risk behavior [Reports changes in hearing] : Reports no changes in hearing [Reports changes in vision] : Reports no changes in vision [Reports changes in dental health] : Reports no changes in dental health [Guns at Home] : no guns at home [Safety elements used in home] : no safety elements used in home [Travel to Developing Areas] : does not  travel to developing areas [Caregiver Concerns] : does not have caregiver concerns [ColonoscopyDate] : 08/10 [ColonoscopyComments] : Cologuamiguel 2019- negative [de-identified] : last exam 01/22 [AdvancecareDate] : 08/21

## 2022-10-05 NOTE — REVIEW OF SYSTEMS
[Dyspnea on Exertion] : dyspnea on exertion [Back Pain] : back pain [Negative] : Psychiatric [Recent Change In Weight] : ~T no recent weight change [Vision Problems] : no vision problems

## 2022-10-05 NOTE — PHYSICAL EXAM
[No Acute Distress] : no acute distress [Well Nourished] : well nourished [No Lymphadenopathy] : no lymphadenopathy [Thyroid Normal, No Nodules] : the thyroid was normal and there were no nodules present [Clear to Auscultation] : lungs were clear to auscultation bilaterally [Normal] : affect was normal and insight and judgment were intact [de-identified] : Impacted cerumen bilaterally [de-identified] : trace edema on the left

## 2022-10-06 ENCOUNTER — APPOINTMENT (OUTPATIENT)
Dept: CARDIOLOGY | Facility: CLINIC | Age: 62
End: 2022-10-06

## 2022-10-06 VITALS
BODY MASS INDEX: 27.2 KG/M2 | SYSTOLIC BLOOD PRESSURE: 131 MMHG | OXYGEN SATURATION: 96 % | HEIGHT: 70 IN | WEIGHT: 190 LBS | HEART RATE: 70 BPM | DIASTOLIC BLOOD PRESSURE: 90 MMHG

## 2022-10-06 DIAGNOSIS — Z87.891 PERSONAL HISTORY OF NICOTINE DEPENDENCE: ICD-10-CM

## 2022-10-06 DIAGNOSIS — Z82.49 FAMILY HISTORY OF ISCHEMIC HEART DISEASE AND OTHER DISEASES OF THE CIRCULATORY SYSTEM: ICD-10-CM

## 2022-10-06 LAB
25(OH)D3 SERPL-MCNC: 21.2 NG/ML
ALBUMIN SERPL ELPH-MCNC: 4.9 G/DL
ALP BLD-CCNC: 61 U/L
ALT SERPL-CCNC: 24 U/L
ANION GAP SERPL CALC-SCNC: 16 MMOL/L
AST SERPL-CCNC: 20 U/L
BASOPHILS # BLD AUTO: 0.08 K/UL
BASOPHILS NFR BLD AUTO: 1.6 %
BILIRUB SERPL-MCNC: 0.4 MG/DL
BUN SERPL-MCNC: 15 MG/DL
CALCIUM SERPL-MCNC: 9.9 MG/DL
CHLORIDE SERPL-SCNC: 102 MMOL/L
CHOLEST SERPL-MCNC: 176 MG/DL
CO2 SERPL-SCNC: 22 MMOL/L
CREAT SERPL-MCNC: 1.03 MG/DL
EGFR: 82 ML/MIN/1.73M2
EOSINOPHIL # BLD AUTO: 0.25 K/UL
EOSINOPHIL NFR BLD AUTO: 5 %
ESTIMATED AVERAGE GLUCOSE: 134 MG/DL
GLUCOSE SERPL-MCNC: 103 MG/DL
HBA1C MFR BLD HPLC: 6.3 %
HCT VFR BLD CALC: 49.2 %
HDLC SERPL-MCNC: 37 MG/DL
HGB BLD-MCNC: 16.3 G/DL
IMM GRANULOCYTES NFR BLD AUTO: 0.2 %
LDLC SERPL CALC-MCNC: 90 MG/DL
LYMPHOCYTES # BLD AUTO: 1.6 K/UL
LYMPHOCYTES NFR BLD AUTO: 32.2 %
MAN DIFF?: NORMAL
MCHC RBC-ENTMCNC: 28.8 PG
MCHC RBC-ENTMCNC: 33.1 GM/DL
MCV RBC AUTO: 86.9 FL
MONOCYTES # BLD AUTO: 0.43 K/UL
MONOCYTES NFR BLD AUTO: 8.7 %
NEUTROPHILS # BLD AUTO: 2.6 K/UL
NEUTROPHILS NFR BLD AUTO: 52.3 %
NONHDLC SERPL-MCNC: 140 MG/DL
PLATELET # BLD AUTO: 245 K/UL
POTASSIUM SERPL-SCNC: 4.2 MMOL/L
PROT SERPL-MCNC: 7 G/DL
PSA SERPL-MCNC: 0.76 NG/ML
RBC # BLD: 5.66 M/UL
RBC # FLD: 14.3 %
SODIUM SERPL-SCNC: 141 MMOL/L
T3 SERPL-MCNC: 79 NG/DL
T3FREE SERPL-MCNC: 2.87 PG/ML
T4 FREE SERPL-MCNC: 1.7 NG/DL
T4 SERPL-MCNC: 6.3 UG/DL
TRIGL SERPL-MCNC: 247 MG/DL
TSH SERPL-ACNC: 2.17 UIU/ML
WBC # FLD AUTO: 4.97 K/UL

## 2022-10-06 PROCEDURE — 99214 OFFICE O/P EST MOD 30 MIN: CPT

## 2022-10-06 PROCEDURE — 93000 ELECTROCARDIOGRAM COMPLETE: CPT

## 2022-10-06 RX ORDER — SIMVASTATIN 40 MG/1
40 TABLET, FILM COATED ORAL
Qty: 90 | Refills: 3 | Status: DISCONTINUED | COMMUNITY
Start: 2021-04-27 | End: 2022-10-06

## 2022-10-06 NOTE — HISTORY OF PRESENT ILLNESS
[FreeTextEntry1] : Patient suffers for HTN, HLD, CAD -> he had an inferior wall MI in 1999, treated with PCI/stent and had a subsequent PCI/stent in 2009\par \par Since then, he's done well.  He denies CP, but has had a couple episodes of dyspnea on exertion while rushing to events Once in [facility] and once in [place]). 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

## 2022-10-09 DIAGNOSIS — U07.1 COVID-19: ICD-10-CM

## 2022-10-09 DIAGNOSIS — R74.8 ABNORMAL LEVELS OF OTHER SERUM ENZYMES: ICD-10-CM

## 2022-10-09 DIAGNOSIS — Z87.39 PERSONAL HISTORY OF OTHER DISEASES OF THE MUSCULOSKELETAL SYSTEM AND CONNECTIVE TISSUE: ICD-10-CM

## 2022-10-09 DIAGNOSIS — Z86.19 PERSONAL HISTORY OF OTHER INFECTIOUS AND PARASITIC DISEASES: ICD-10-CM

## 2022-10-09 DIAGNOSIS — Z87.898 PERSONAL HISTORY OF OTHER SPECIFIED CONDITIONS: ICD-10-CM

## 2022-10-09 DIAGNOSIS — Z01.810 ENCOUNTER FOR PREPROCEDURAL CARDIOVASCULAR EXAMINATION: ICD-10-CM

## 2022-10-12 ENCOUNTER — APPOINTMENT (OUTPATIENT)
Dept: CARDIOLOGY | Facility: CLINIC | Age: 62
End: 2022-10-12

## 2022-10-18 ENCOUNTER — APPOINTMENT (OUTPATIENT)
Dept: CARDIOLOGY | Facility: CLINIC | Age: 62
End: 2022-10-18

## 2022-10-18 PROCEDURE — 93306 TTE W/DOPPLER COMPLETE: CPT

## 2022-11-07 ENCOUNTER — RESULT REVIEW (OUTPATIENT)
Age: 62
End: 2022-11-07

## 2022-11-09 ENCOUNTER — LABORATORY RESULT (OUTPATIENT)
Age: 62
End: 2022-11-09

## 2022-11-09 ENCOUNTER — APPOINTMENT (OUTPATIENT)
Dept: PULMONOLOGY | Facility: CLINIC | Age: 62
End: 2022-11-09

## 2022-11-09 ENCOUNTER — RESULT REVIEW (OUTPATIENT)
Age: 62
End: 2022-11-09

## 2022-11-09 ENCOUNTER — NON-APPOINTMENT (OUTPATIENT)
Age: 62
End: 2022-11-09

## 2022-11-09 VITALS
HEART RATE: 71 BPM | BODY MASS INDEX: 27.2 KG/M2 | SYSTOLIC BLOOD PRESSURE: 134 MMHG | HEIGHT: 70 IN | TEMPERATURE: 96.5 F | OXYGEN SATURATION: 97 % | WEIGHT: 190 LBS | DIASTOLIC BLOOD PRESSURE: 90 MMHG

## 2022-11-09 DIAGNOSIS — J30.89 OTHER ALLERGIC RHINITIS: ICD-10-CM

## 2022-11-09 DIAGNOSIS — J43.2 CENTRILOBULAR EMPHYSEMA: ICD-10-CM

## 2022-11-09 DIAGNOSIS — I25.10 ATHEROSCLEROTIC HEART DISEASE OF NATIVE CORONARY ARTERY W/OUT ANGINA PECTORIS: ICD-10-CM

## 2022-11-09 DIAGNOSIS — R06.09 OTHER FORMS OF DYSPNEA: ICD-10-CM

## 2022-11-09 DIAGNOSIS — R06.83 SNORING: ICD-10-CM

## 2022-11-09 PROCEDURE — 94010 BREATHING CAPACITY TEST: CPT

## 2022-11-09 PROCEDURE — 99204 OFFICE O/P NEW MOD 45 MIN: CPT | Mod: 25

## 2022-11-09 NOTE — REVIEW OF SYSTEMS
[SOB on Exertion] : sob on exertion [Arthralgias] : arthralgias [Thyroid Problem] : thyroid problem [Negative] : Endocrine

## 2022-11-15 ENCOUNTER — APPOINTMENT (OUTPATIENT)
Dept: CARDIOLOGY | Facility: CLINIC | Age: 62
End: 2022-11-15

## 2022-11-15 VITALS
HEIGHT: 70 IN | OXYGEN SATURATION: 98 % | BODY MASS INDEX: 27.92 KG/M2 | DIASTOLIC BLOOD PRESSURE: 93 MMHG | HEART RATE: 69 BPM | WEIGHT: 195 LBS | SYSTOLIC BLOOD PRESSURE: 143 MMHG

## 2022-11-15 DIAGNOSIS — Z92.89 PERSONAL HISTORY OF OTHER MEDICAL TREATMENT: ICD-10-CM

## 2022-11-15 PROCEDURE — 99214 OFFICE O/P EST MOD 30 MIN: CPT

## 2022-11-15 NOTE — HISTORY OF PRESENT ILLNESS
[FreeTextEntry1] : Doing well\par Jogs a little with his dog -. no limiting dyspnea\par No CP\par \par Saw Dr Stephens -. likely COPD -. work up ordered (CXR unremarkable) -. due for PFTs and sleep study\par \par

## 2022-11-15 NOTE — REASON FOR VISIT
[Symptom and Test Evaluation] : symptom and test evaluation [Coronary Artery Disease] : coronary artery disease [FreeTextEntry3] : Dr Stevens

## 2022-11-16 PROBLEM — R06.09 DYSPNEA ON EXERTION: Status: ACTIVE | Noted: 2022-10-06

## 2022-11-16 PROBLEM — R06.83 SNORING: Status: ACTIVE | Noted: 2022-11-09

## 2022-11-16 PROBLEM — J43.2 CENTRILOBULAR EMPHYSEMA: Status: ACTIVE | Noted: 2022-11-09

## 2022-11-16 PROBLEM — I25.10 ATHEROSCLEROTIC HEART DISEASE: Status: ACTIVE | Noted: 2019-02-20

## 2022-11-16 NOTE — REASON FOR VISIT
[Initial] : an initial visit [Shortness of Breath] : shortness of breath [TextBox_44] : abnormal echo

## 2022-11-16 NOTE — PHYSICAL EXAM
[No Acute Distress] : no acute distress [Normal Appearance] : normal appearance [No Neck Mass] : no neck mass [Normal Rate/Rhythm] : normal rate/rhythm [Normal S1, S2] : normal s1, s2 [No Murmurs] : no murmurs [No Resp Distress] : no resp distress [No Abnormalities] : no abnormalities [Benign] : benign [Normal Gait] : normal gait [No Cyanosis] : no cyanosis [No Edema] : no edema [FROM] : FROM [Normal Color/ Pigmentation] : normal color/ pigmentation [No Focal Deficits] : no focal deficits [Oriented x3] : oriented x3 [Normal Affect] : normal affect [Erythema] : erythema [Turbinate hypertrophy] : turbinate hypertrophy [No Rubs] : no rubs [No Gallops] : no gallops [TextBox_11] : crowded o/p, + erythematous posterior o/p especially involving the 1+ R tonsil [TextBox_68] : prolonged expiratory phase [TextBox_105] : mild CVS changes, slight varicosity, mild clubbing

## 2022-12-08 ENCOUNTER — RESULT REVIEW (OUTPATIENT)
Age: 62
End: 2022-12-08

## 2022-12-08 ENCOUNTER — NON-APPOINTMENT (OUTPATIENT)
Age: 62
End: 2022-12-08

## 2022-12-09 ENCOUNTER — RESULT REVIEW (OUTPATIENT)
Age: 62
End: 2022-12-09

## 2022-12-09 ENCOUNTER — APPOINTMENT (OUTPATIENT)
Dept: HEMATOLOGY ONCOLOGY | Facility: CLINIC | Age: 62
End: 2022-12-09

## 2022-12-09 ENCOUNTER — NON-APPOINTMENT (OUTPATIENT)
Age: 62
End: 2022-12-09

## 2022-12-09 VITALS
HEART RATE: 69 BPM | DIASTOLIC BLOOD PRESSURE: 75 MMHG | HEIGHT: 70 IN | SYSTOLIC BLOOD PRESSURE: 115 MMHG | BODY MASS INDEX: 28.03 KG/M2 | RESPIRATION RATE: 17 BRPM | TEMPERATURE: 97 F | OXYGEN SATURATION: 98 % | WEIGHT: 195.8 LBS

## 2022-12-09 DIAGNOSIS — R93.89 ABNORMAL FINDINGS ON DIAGNOSTIC IMAGING OF OTHER SPECIFIED BODY STRUCTURES: ICD-10-CM

## 2022-12-09 DIAGNOSIS — Z80.1 FAMILY HISTORY OF MALIGNANT NEOPLASM OF TRACHEA, BRONCHUS AND LUNG: ICD-10-CM

## 2022-12-09 PROCEDURE — 99205 OFFICE O/P NEW HI 60 MIN: CPT | Mod: 25

## 2022-12-09 PROCEDURE — 36415 COLL VENOUS BLD VENIPUNCTURE: CPT

## 2022-12-09 RX ORDER — PREDNISONE 20 MG/1
20 TABLET ORAL
Qty: 8 | Refills: 0 | Status: COMPLETED | COMMUNITY
Start: 2022-12-05 | End: 2022-12-09

## 2022-12-09 NOTE — ASSESSMENT
[FreeTextEntry1] : # 2.5 x 1.4 x 2.9 cm soft tissue structure in the anterior mediastinum with adjacent anterior mediastinal fat nodularity\par Reviewed the differential diagnosis of a mediastinal mass\par Reviewed the imaging\par recommend PET CT\par Recommend biopsy to determine diagnosis\par no B symptoms or LAD\par no testicular abnormalities\par check CBC with diff, cmp, LDH.\par \par #ascending aortic root aneurysm\par monitored by Dr. Mcfarlane\par \par #pulm HTN\par monitored by Dr. Mcfarlane and Dr. Stephens\par \par #health maintenance\par denies any testicular changes\par PSA wnl\par had cologuard in 2019, CNY 2009 - recommend follow up for CNY\par \par tele 12/27 to review PET CT and biopsy results

## 2022-12-09 NOTE — HISTORY OF PRESENT ILLNESS
[de-identified] : Mr.Frederick Carranza is a 62 year old male who presents to the office for a found mass on CT scan \par About a month ago, was having frequent shortness of breath. He was seen by his cardiologist who ordered an echo and was found to have pulmonary HTN\par On Dec 8th, 2022 he underwent a CT angiogram  to r/o a PE. 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 He also has a aneurysmal aortic root measuring 4.2 cm. He also had several low-density liver lesions too small to characterize. \par Worked when 9[place]. \par \par Past hx: \par HLD, HTN, vitamin D deficient , COVID (April 2022), 2x stent placement, hypothyroid \par COVID booster: 12/4/2022\par ECHO: elevated pulmonary artery pressure\par Nuclear stress test: stable \par Sleep apnea: indeterminate but getting worked up. 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

## 2022-12-12 ENCOUNTER — RESULT REVIEW (OUTPATIENT)
Age: 62
End: 2022-12-12

## 2022-12-13 ENCOUNTER — RESULT REVIEW (OUTPATIENT)
Age: 62
End: 2022-12-13

## 2022-12-14 ENCOUNTER — RESULT REVIEW (OUTPATIENT)
Age: 62
End: 2022-12-14

## 2022-12-16 LAB
A ALTERNATA IGE QN: <0.1 KUA/L
A FUMIGATUS IGE QN: <0.1 KUA/L
ALBUMIN SERPL ELPH-MCNC: 4.8 G/DL
ALP BLD-CCNC: 60 U/L
ALT SERPL-CCNC: 22 U/L
ANION GAP SERPL CALC-SCNC: 13 MMOL/L
AST SERPL-CCNC: 21 U/L
BASOPHILS # BLD AUTO: 0.06 K/UL
BASOPHILS NFR BLD AUTO: 1.1 %
BERMUDA GRASS IGE QN: <0.1 KUA/L
BILIRUB SERPL-MCNC: 0.3 MG/DL
BOXELDER IGE QN: <0.1 KUA/L
BUN SERPL-MCNC: 15 MG/DL
C HERBARUM IGE QN: <0.1 KUA/L
CALCIUM SERPL-MCNC: 9.7 MG/DL
CALIF WALNUT IGE QN: <0.1 KUA/L
CAT (RFEL D) 1 IGE QN: 0.12 KUA/L
CAT (RFEL D) 4 IGE QN: <0.1 KUA/L
CAT (RFEL D) 7 IGE QN: <0.1 KUA/L
CAT DANDER IGE QN: <0.1 KUA/L
CAT SERUM ALB IGE QN: <0.1 KUA/L
CHLORIDE SERPL-SCNC: 102 MMOL/L
CMN PIGWEED IGE QN: <0.1 KUA/L
CO2 SERPL-SCNC: 22 MMOL/L
COMMON RAGWEED IGE QN: <0.1 KUA/L
COTTONWOOD IGE QN: <0.1 KUA/L
CREAT SERPL-MCNC: 1.03 MG/DL
CRP SERPL-MCNC: <3 MG/L
D FARINAE IGE QN: <0.1 KUA/L
D PTERONYSS IGE QN: <0.1 KUA/L
DEPRECATED A ALTERNATA IGE RAST QL: 0
DEPRECATED A FUMIGATUS IGE RAST QL: 0
DEPRECATED BERMUDA GRASS IGE RAST QL: 0
DEPRECATED BOXELDER IGE RAST QL: 0
DEPRECATED C HERBARUM IGE RAST QL: 0
DEPRECATED CAT (RFEL D) 1 IGE RAST QL: ABNORMAL
DEPRECATED CAT (RFEL D) 4 IGE RAST QL: 0
DEPRECATED CAT (RFEL D) 7 IGE RAST QL: 0
DEPRECATED CAT DANDER IGE RAST QL: 0
DEPRECATED CAT SERUM ALB IGE RAST QL: 0
DEPRECATED COMMON PIGWEED IGE RAST QL: 0
DEPRECATED COMMON RAGWEED IGE RAST QL: 0
DEPRECATED COTTONWOOD IGE RAST QL: 0
DEPRECATED D FARINAE IGE RAST QL: 0
DEPRECATED D PTERONYSS IGE RAST QL: 0
DEPRECATED DOG (RCAN F) 1 IGE RAST QL: 0
DEPRECATED DOG (RCAN F) 2 IGE RAST QL: 0
DEPRECATED DOG (RCAN F) 4 IGE RAST QL: 0
DEPRECATED DOG (RCAN F) 5 IGE RAST QL: 0
DEPRECATED DOG (RCAN F) 6 IGE RAST QL: 0
DEPRECATED DOG DANDER IGE RAST QL: 0
DEPRECATED DOG SERUM ALB IGE RAST QL: 0
DEPRECATED GOOSEFOOT IGE RAST QL: 0
DEPRECATED LONDON PLANE IGE RAST QL: 0
DEPRECATED MOUSE URINE PROT IGE RAST QL: 0
DEPRECATED MUGWORT IGE RAST QL: 0
DEPRECATED P NOTATUM IGE RAST QL: 0
DEPRECATED RED CEDAR IGE RAST QL: 0
DEPRECATED ROACH IGE RAST QL: 0
DEPRECATED SHEEP SORREL IGE RAST QL: 0
DEPRECATED SILVER BIRCH IGE RAST QL: 2
DEPRECATED TIMOTHY IGE RAST QL: 0
DEPRECATED WHITE ASH IGE RAST QL: 1
DEPRECATED WHITE OAK IGE RAST QL: 2
DOG (RCAN F) 1 IGE QN: <0.1 KUA/L
DOG (RCAN F) 2 IGE QN: <0.1 KUA/L
DOG (RCAN F) 4 IGE QN: <0.1 KUA/L
DOG (RCAN F) 5 IGE QN: <0.1 KUA/L
DOG (RCAN F) 6 IGE QN: <0.1 KUA/L
DOG DANDER IGE QN: <0.1 KUA/L
DOG SERUM ALB IGE QN: <0.1 KUA/L
EGFR: 82 ML/MIN/1.73M2
EOSINOPHIL # BLD AUTO: 0.26 K/UL
EOSINOPHIL NFR BLD AUTO: 4.6 %
GLUCOSE SERPL-MCNC: 97 MG/DL
GOOSEFOOT IGE QN: <0.1 KUA/L
HCT VFR BLD CALC: 46.9 %
HGB BLD-MCNC: 15.3 G/DL
HORSE (REQU C) 1 IGE QN: 0
HORSE REQU C 1 IGE E227 CONC: <0.1 KUA/L
IMM GRANULOCYTES NFR BLD AUTO: 0.4 %
LONDON PLANE IGE QN: <0.1 KUA/L
LYMPHOCYTES # BLD AUTO: 1.79 K/UL
LYMPHOCYTES NFR BLD AUTO: 31.4 %
MAN DIFF?: NORMAL
MCHC RBC-ENTMCNC: 28.8 PG
MCHC RBC-ENTMCNC: 32.6 GM/DL
MCV RBC AUTO: 88.2 FL
MONOCYTES # BLD AUTO: 0.56 K/UL
MONOCYTES NFR BLD AUTO: 9.8 %
MOUSE URINE PROT IGE QN: <0.1 KUA/L
MUGWORT IGE QN: <0.1 KUA/L
MULBERRY (T70) CLASS: 0
MULBERRY (T70) CONC: <0.1 KUA/L
NEUTROPHILS # BLD AUTO: 3.01 K/UL
NEUTROPHILS NFR BLD AUTO: 52.7 %
P NOTATUM IGE QN: <0.1 KUA/L
PLATELET # BLD AUTO: 220 K/UL
POTASSIUM SERPL-SCNC: 4.1 MMOL/L
PROT SERPL-MCNC: 7 G/DL
RBC # BLD: 5.32 M/UL
RBC # FLD: 13.8 %
RED CEDAR IGE QN: <0.1 KUA/L
ROACH IGE QN: <0.1 KUA/L
SHEEP SORREL IGE QN: <0.1 KUA/L
SILVER BIRCH IGE QN: 2.87 KUA/L
SODIUM SERPL-SCNC: 137 MMOL/L
TIMOTHY IGE QN: <0.1 KUA/L
TOTAL IGE SMQN RAST: 43 KU/L
TREE ALLERG MIX1 IGE QL: 0
WBC # FLD AUTO: 5.7 K/UL
WHITE ASH IGE QN: 0.56 KUA/L
WHITE ELM IGE QN: 0
WHITE ELM IGE QN: <0.1 KUA/L
WHITE OAK IGE QN: 2.63 KUA/L

## 2022-12-27 ENCOUNTER — APPOINTMENT (OUTPATIENT)
Dept: HEMATOLOGY ONCOLOGY | Facility: CLINIC | Age: 62
End: 2022-12-27

## 2022-12-27 DIAGNOSIS — I71.21 ANEURYSM OF THE ASCENDING AORTA, WITHOUT RUPTURE: ICD-10-CM

## 2022-12-27 PROCEDURE — 99214 OFFICE O/P EST MOD 30 MIN: CPT | Mod: 95

## 2022-12-27 NOTE — ASSESSMENT
[FreeTextEntry1] : # 2.5 x 1.4 x 2.9 cm soft tissue structure in the anterior mediastinum with adjacent anterior mediastinal fat nodularity\par s/p thymic tissue in a background of adipose tissue\par recommend PET CT - will schedule for tomorrow\par no B symptoms or LAD\par no testicular abnormalities\par CBC with diff, cmp, LDH - wnl\par DW Dr. Trinidad, if PET negative can continue to monitor with yearly CT scan vs removal of mediastinal mass to confirm that this is just thymic tissue due to small size and no symptoms\par \par #ascending aortic root aneurysm\par monitored by Dr. Mcfarlane\par \par #pulm HTN\par monitored by Dr. Mcfarlane and Dr. Stephens\par \par #health maintenance\par denies any testicular changes\par PSA wnl\par had cologuard in 2019, CNY 2009 - recommend follow up for CNY\par \par Will follow up via phone regarding PET CT results

## 2022-12-27 NOTE — REASON FOR VISIT
[Home] : at home, [unfilled] , at the time of the visit. [Medical Office: ([facility])___] : at the medical office located in  [Spouse] : spouse [Patient] : the patient [FreeTextEntry2] : abnormal mass

## 2022-12-27 NOTE — HISTORY OF PRESENT ILLNESS
[de-identified] : Mr.Frederick Carranza is a 62 year old male who presents to the office for a found mass on CT scan \par About a month ago, was having frequent shortness of breath. He was seen by his cardiologist who ordered an echo and was found to have pulmonary HTN\par On Dec 8th, 2022 he underwent a CT angiogram  to r/o a PE. 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 He also has a aneurysmal aortic root measuring 4.2 cm. He also had several low-density liver lesions too small to characterize. \par Worked when 9[place] occurred [place]. \par \par Past hx: \par HLD, HTN, vitamin D deficient , COVID (April 2022), 2x stent placement, hypothyroid \par COVID booster: 12/4/2022\par ECHO: elevated pulmonary artery pressure\par Nuclear stress test: stable \par Sleep apnea: indeterminate but getting worked up. 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 \par s/p mediastinal mass bx on 12/14/22 pathology revealing minute thymic tissue in the background of fat.

## 2022-12-28 ENCOUNTER — RESULT REVIEW (OUTPATIENT)
Age: 62
End: 2022-12-28

## 2023-01-08 LAB
ALBUMIN SERPL ELPH-MCNC: 4.8 G/DL
ALP BLD-CCNC: 66 U/L
ALT SERPL-CCNC: 26 U/L
AST SERPL-CCNC: 19 U/L
BILIRUB DIRECT SERPL-MCNC: 0.1 MG/DL
BILIRUB INDIRECT SERPL-MCNC: 0.3 MG/DL
BILIRUB SERPL-MCNC: 0.4 MG/DL
CHOLEST SERPL-MCNC: 147 MG/DL
ESTIMATED AVERAGE GLUCOSE: 143 MG/DL
HBA1C MFR BLD HPLC: 6.6 %
HDLC SERPL-MCNC: 39 MG/DL
LDLC SERPL CALC-MCNC: 69 MG/DL
NONHDLC SERPL-MCNC: 108 MG/DL
PROT SERPL-MCNC: 6.9 G/DL
TRIGL SERPL-MCNC: 191 MG/DL

## 2023-03-07 ENCOUNTER — NON-APPOINTMENT (OUTPATIENT)
Age: 63
End: 2023-03-07

## 2023-03-07 RX ORDER — ALPRAZOLAM 1 MG/1
1 TABLET ORAL
Qty: 30 | Refills: 0 | Status: ACTIVE | COMMUNITY
Start: 2021-08-07 | End: 1900-01-01

## 2023-04-03 ENCOUNTER — RX RENEWAL (OUTPATIENT)
Age: 63
End: 2023-04-03

## 2023-04-03 RX ORDER — ERGOCALCIFEROL 1.25 MG/1
1.25 MG CAPSULE, LIQUID FILLED ORAL
Qty: 4 | Refills: 5 | Status: ACTIVE | COMMUNITY
Start: 2021-11-05 | End: 1900-01-01

## 2023-06-24 DIAGNOSIS — B96.89 ACUTE SINUSITIS, UNSPECIFIED: ICD-10-CM

## 2023-06-24 DIAGNOSIS — J01.90 ACUTE SINUSITIS, UNSPECIFIED: ICD-10-CM

## 2023-06-24 DIAGNOSIS — U09.9 POST COVID-19 CONDITION, UNSPECIFIED: ICD-10-CM

## 2023-07-05 DIAGNOSIS — Z12.5 ENCOUNTER FOR SCREENING FOR MALIGNANT NEOPLASM OF PROSTATE: ICD-10-CM

## 2023-07-05 DIAGNOSIS — I27.20 PULMONARY HYPERTENSION, UNSPECIFIED: ICD-10-CM

## 2023-07-05 DIAGNOSIS — J98.59 OTHER DISEASES OF MEDIASTINUM, NOT ELSEWHERE CLASSIFIED: ICD-10-CM

## 2023-07-06 DIAGNOSIS — D49.89 NEOPLASM OF UNSPECIFIED BEHAVIOR OF OTHER SPECIFIED SITES: ICD-10-CM

## 2023-07-06 DIAGNOSIS — Z91.041 RADIOGRAPHIC DYE ALLERGY STATUS: ICD-10-CM

## 2023-07-06 LAB
ALBUMIN SERPL ELPH-MCNC: 5.1 G/DL
ALP BLD-CCNC: 62 U/L
ALT SERPL-CCNC: 36 U/L
ANION GAP SERPL CALC-SCNC: 12 MMOL/L
AST SERPL-CCNC: 28 U/L
BILIRUB SERPL-MCNC: 0.4 MG/DL
BUN SERPL-MCNC: 16 MG/DL
CALCIUM SERPL-MCNC: 9.8 MG/DL
CHLORIDE SERPL-SCNC: 102 MMOL/L
CHOLEST SERPL-MCNC: 127 MG/DL
CO2 SERPL-SCNC: 27 MMOL/L
CREAT SERPL-MCNC: 1.07 MG/DL
EGFR: 78 ML/MIN/1.73M2
ESTIMATED AVERAGE GLUCOSE: 140 MG/DL
GLUCOSE SERPL-MCNC: 92 MG/DL
HBA1C MFR BLD HPLC: 6.5 %
HDLC SERPL-MCNC: 41 MG/DL
LDH SERPL-CCNC: 214 U/L
LDLC SERPL CALC-MCNC: 54 MG/DL
NONHDLC SERPL-MCNC: 87 MG/DL
POTASSIUM SERPL-SCNC: 4.6 MMOL/L
PROT SERPL-MCNC: 7.2 G/DL
PSA SERPL-MCNC: 0.52 NG/ML
SODIUM SERPL-SCNC: 142 MMOL/L
TRIGL SERPL-MCNC: 164 MG/DL
TSH SERPL-ACNC: 1.58 UIU/ML

## 2023-07-09 ENCOUNTER — NON-APPOINTMENT (OUTPATIENT)
Age: 63
End: 2023-07-09

## 2023-07-09 DIAGNOSIS — R05.9 COUGH, UNSPECIFIED: ICD-10-CM

## 2023-07-14 ENCOUNTER — RESULT REVIEW (OUTPATIENT)
Age: 63
End: 2023-07-14

## 2023-07-18 ENCOUNTER — RESULT REVIEW (OUTPATIENT)
Age: 63
End: 2023-07-18

## 2023-08-14 RX ORDER — VALSARTAN 160 MG/1
160 TABLET, COATED ORAL
Qty: 90 | Refills: 3 | Status: ACTIVE | COMMUNITY
Start: 2021-04-27 | End: 1900-01-01

## 2023-09-02 ENCOUNTER — RX RENEWAL (OUTPATIENT)
Age: 63
End: 2023-09-02

## 2023-09-02 RX ORDER — METOPROLOL SUCCINATE 50 MG/1
50 TABLET, EXTENDED RELEASE ORAL DAILY
Qty: 90 | Refills: 3 | Status: ACTIVE | COMMUNITY
Start: 2022-01-11 | End: 1900-01-01

## 2023-12-11 ENCOUNTER — RX CHANGE (OUTPATIENT)
Age: 63
End: 2023-12-11

## 2023-12-11 RX ORDER — ROSUVASTATIN CALCIUM 40 MG/1
40 TABLET, FILM COATED ORAL
Qty: 90 | Refills: 3 | Status: ACTIVE | COMMUNITY
Start: 1900-01-01 | End: 1900-01-01

## 2023-12-11 RX ORDER — ROSUVASTATIN CALCIUM 40 MG/1
40 TABLET, FILM COATED ORAL DAILY
Qty: 90 | Refills: 3 | Status: DISCONTINUED | COMMUNITY
Start: 2022-10-06 | End: 2023-12-11

## 2023-12-26 ENCOUNTER — APPOINTMENT (OUTPATIENT)
Dept: FAMILY MEDICINE | Facility: CLINIC | Age: 63
End: 2023-12-26
Payer: COMMERCIAL

## 2023-12-26 VITALS
DIASTOLIC BLOOD PRESSURE: 72 MMHG | BODY MASS INDEX: 28.35 KG/M2 | OXYGEN SATURATION: 97 % | SYSTOLIC BLOOD PRESSURE: 112 MMHG | HEART RATE: 67 BPM | HEIGHT: 70 IN | WEIGHT: 198 LBS

## 2023-12-26 DIAGNOSIS — E66.3 OVERWEIGHT: ICD-10-CM

## 2023-12-26 DIAGNOSIS — Z00.00 ENCOUNTER FOR GENERAL ADULT MEDICAL EXAMINATION W/OUT ABNORMAL FINDINGS: ICD-10-CM

## 2023-12-26 DIAGNOSIS — L85.3 XEROSIS CUTIS: ICD-10-CM

## 2023-12-26 DIAGNOSIS — E89.0 POSTPROCEDURAL HYPOTHYROIDISM: ICD-10-CM

## 2023-12-26 DIAGNOSIS — I10 ESSENTIAL (PRIMARY) HYPERTENSION: ICD-10-CM

## 2023-12-26 DIAGNOSIS — E78.5 HYPERLIPIDEMIA, UNSPECIFIED: ICD-10-CM

## 2023-12-26 DIAGNOSIS — R73.03 PREDIABETES.: ICD-10-CM

## 2023-12-26 DIAGNOSIS — E11.9 TYPE 2 DIABETES MELLITUS W/OUT COMPLICATIONS: ICD-10-CM

## 2023-12-26 DIAGNOSIS — E55.9 VITAMIN D DEFICIENCY, UNSPECIFIED: ICD-10-CM

## 2023-12-26 PROCEDURE — 36415 COLL VENOUS BLD VENIPUNCTURE: CPT

## 2023-12-26 PROCEDURE — 99396 PREV VISIT EST AGE 40-64: CPT | Mod: 25

## 2023-12-26 RX ORDER — AMOXICILLIN AND CLAVULANATE POTASSIUM 875; 125 MG/1; MG/1
875-125 TABLET, COATED ORAL
Qty: 20 | Refills: 0 | Status: DISCONTINUED | COMMUNITY
Start: 2023-06-24 | End: 2023-12-26

## 2023-12-26 RX ORDER — PREDNISONE 50 MG/1
50 TABLET ORAL
Qty: 3 | Refills: 0 | Status: DISCONTINUED | COMMUNITY
Start: 2023-07-06 | End: 2023-12-26

## 2023-12-26 RX ORDER — SEMAGLUTIDE 0.25 MG/.5ML
0.25 INJECTION, SOLUTION SUBCUTANEOUS
Qty: 4 | Refills: 1 | Status: DISCONTINUED | COMMUNITY
Start: 2023-12-26 | End: 2023-12-26

## 2023-12-26 RX ORDER — OMEGA-3-ACID ETHYL ESTERS CAPSULES 1 G/1
1 CAPSULE, LIQUID FILLED ORAL DAILY
Qty: 60 | Refills: 3 | Status: DISCONTINUED | COMMUNITY
Start: 2023-01-08 | End: 2023-12-26

## 2023-12-26 RX ORDER — TIOTROPIUM BROMIDE INHALATION SPRAY 3.12 UG/1
2.5 SPRAY, METERED RESPIRATORY (INHALATION) DAILY
Qty: 3 | Refills: 3 | Status: DISCONTINUED | COMMUNITY
Start: 2022-12-02 | End: 2023-12-26

## 2023-12-26 RX ORDER — ERGOCALCIFEROL 1.25 MG/1
1.25 MG CAPSULE ORAL
Qty: 4 | Refills: 5 | Status: DISCONTINUED | COMMUNITY
Start: 2022-10-06 | End: 2023-12-26

## 2023-12-26 RX ORDER — OMEGA-3-ACID ETHYL ESTERS CAPSULES 1 G/1
1 CAPSULE, LIQUID FILLED ORAL DAILY
Qty: 60 | Refills: 3 | Status: DISCONTINUED | COMMUNITY
Start: 2022-10-09 | End: 2023-12-26

## 2023-12-26 RX ORDER — DIPHENHYDRAMINE HCL 50 MG/1
50 CAPSULE ORAL
Qty: 1 | Refills: 0 | Status: DISCONTINUED | COMMUNITY
Start: 2023-07-06 | End: 2023-12-26

## 2023-12-26 RX ORDER — HYDROCODONE BITARTRATE AND HOMATROPINE METHYLBROMIDE 1.5; 5 MG/5ML; MG/5ML
5-1.5 SOLUTION ORAL
Qty: 210 | Refills: 0 | Status: DISCONTINUED | COMMUNITY
Start: 2023-07-09 | End: 2023-12-26

## 2023-12-26 RX ORDER — OMEGA-3/DHA/EPA/FISH OIL 300-1000MG
1000 CAPSULE ORAL DAILY
Refills: 0 | Status: ACTIVE | COMMUNITY

## 2023-12-26 RX ORDER — METFORMIN HYDROCHLORIDE 500 MG/1
500 TABLET, COATED ORAL
Qty: 30 | Refills: 2 | Status: DISCONTINUED | COMMUNITY
Start: 2022-10-06 | End: 2023-12-26

## 2023-12-26 NOTE — REVIEW OF SYSTEMS
[Fever] : no fever [Chills] : no chills [Fatigue] : no fatigue [Night Sweats] : no night sweats [Earache] : no earache [Chest Pain] : no chest pain [Palpitations] : no palpitations [Shortness Of Breath] : no shortness of breath [Wheezing] : no wheezing [Abdominal Pain] : no abdominal pain [Nausea] : no nausea [Constipation] : no constipation [Vomiting] : no vomiting [Headache] : no headache

## 2023-12-26 NOTE — PHYSICAL EXAM
[No Acute Distress] : no acute distress [Well-Appearing] : well-appearing [Normal Voice/Communication] : normal voice/communication [Normal Sclera/Conjunctiva] : normal sclera/conjunctiva [PERRL] : pupils equal round and reactive to light [EOMI] : extraocular movements intact [Normal Outer Ear/Nose] : the outer ears and nose were normal in appearance [Normal Oropharynx] : the oropharynx was normal [No Lymphadenopathy] : no lymphadenopathy [Supple] : supple [No Respiratory Distress] : no respiratory distress  [No Accessory Muscle Use] : no accessory muscle use [Clear to Auscultation] : lungs were clear to auscultation bilaterally [Normal Rate] : normal rate  [Regular Rhythm] : with a regular rhythm [Normal S1, S2] : normal S1 and S2 [No Edema] : there was no peripheral edema [Soft] : abdomen soft [Non Tender] : non-tender [Non-distended] : non-distended [Normal Bowel Sounds] : normal bowel sounds [Coordination Grossly Intact] : coordination grossly intact [No Focal Deficits] : no focal deficits [Normal Gait] : normal gait [Normal] : affect was normal and insight and judgment were intact [de-identified] : right ear cerumen impaction

## 2023-12-26 NOTE — HISTORY OF PRESENT ILLNESS
[FreeTextEntry1] : CPE [de-identified] : 63 year old M  presents for CPE Pt is feeling well today - no acute complaints Lives w/  (Casey Aldana) has a dog. Feels safe at home. 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

## 2023-12-26 NOTE — HEALTH RISK ASSESSMENT
[Yes] : Yes [1 or 2 (0 pts)] : 1 or 2 (0 points) [Never (0 pts)] : Never (0 points) [No] : In the past 12 months have you used drugs other than those required for medical reasons? No [No falls in past year] : Patient reported no falls in the past year [0] : 2) Feeling down, depressed, or hopeless: Not at all (0) [PHQ-2 Negative - No further assessment needed] : PHQ-2 Negative - No further assessment needed [No Retinopathy] : No retinopathy [Patient reported colonoscopy was normal] : Patient reported colonoscopy was normal [HIV test declined] : HIV test declined [Hepatitis C test declined] : Hepatitis C test declined [None] : None [With Significant Other] : lives with significant other [# of Members in Household ___] :  household currently consist of [unfilled] member(s) [Retired] : retired [College] : College [] :  [# Of Children ___] : has [unfilled] children [Sexually Active] : sexually active [Fully functional (bathing, dressing, toileting, transferring, walking, feeding)] : Fully functional (bathing, dressing, toileting, transferring, walking, feeding) [Fully functional (using the telephone, shopping, preparing meals, housekeeping, doing laundry, using] : Fully functional and needs no help or supervision to perform IADLs (using the telephone, shopping, preparing meals, housekeeping, doing laundry, using transportation, managing medications and managing finances) [Reports normal functional visual acuity (ie: able to read med bottle)] : Reports normal functional visual acuity [Smoke Detector] : smoke detector [Carbon Monoxide Detector] : carbon monoxide detector [Seat Belt] :  uses seat belt [Sunscreen] : uses sunscreen [With Patient/Caregiver] : , with patient/caregiver [Reviewed no changes] : Reviewed, no changes [Designated Healthcare Proxy] : Designated healthcare proxy [Name: ___] : Health Care Proxy's Name: [unfilled]  [Relationship: ___] : Relationship: [unfilled] [DNR] : DNR [Former] : Former [Excellent] : ~his/her~ current health as excellent [Good] : ~his/her~  mood as  good [2 - 4 times a month (2 pts)] : 2-4 times a month (2 points) [FreeTextEntry1] : right lower back and buttock [de-identified] : socially  [Audit-CScore] : 2 [de-identified] : Walks dog every day [de-identified] : Normal [[id number]] : 0 [EyeExamDate] : 10/2023 [Change in mental status noted] : No change in mental status noted [High Risk Behavior] : no high risk behavior [Reports changes in hearing] : Reports no changes in hearing [Reports changes in vision] : Reports no changes in vision [Reports changes in dental health] : Reports no changes in dental health [Guns at Home] : no guns at home [Safety elements used in home] : no safety elements used in home [Travel to Developing Areas] : does not  travel to developing areas [Caregiver Concerns] : does not have caregiver concerns [ColonoscopyDate] : 08/10 [ColonoscopyComments] : [facility] on 03/2023 [de-identified] : last exam 01/22 [AdvancecareDate] : 08/21 [de-identified] : 1 pack a day for 17 years, quit 1994

## 2023-12-28 LAB
25(OH)D3 SERPL-MCNC: 49.6 NG/ML
ALBUMIN SERPL ELPH-MCNC: 4.8 G/DL
ALP BLD-CCNC: 56 U/L
ALT SERPL-CCNC: 33 U/L
ANION GAP SERPL CALC-SCNC: 12 MMOL/L
AST SERPL-CCNC: 27 U/L
BILIRUB SERPL-MCNC: 0.4 MG/DL
BUN SERPL-MCNC: 19 MG/DL
CALCIUM SERPL-MCNC: 9.4 MG/DL
CHLORIDE SERPL-SCNC: 105 MMOL/L
CHOLEST SERPL-MCNC: 120 MG/DL
CO2 SERPL-SCNC: 25 MMOL/L
CREAT SERPL-MCNC: 1.08 MG/DL
EGFR: 77 ML/MIN/1.73M2
ESTIMATED AVERAGE GLUCOSE: 137 MG/DL
GLUCOSE SERPL-MCNC: 110 MG/DL
HBA1C MFR BLD HPLC: 6.4 %
HDLC SERPL-MCNC: 37 MG/DL
LDLC SERPL CALC-MCNC: 60 MG/DL
NONHDLC SERPL-MCNC: 83 MG/DL
POTASSIUM SERPL-SCNC: 4.6 MMOL/L
PROT SERPL-MCNC: 6.8 G/DL
SODIUM SERPL-SCNC: 142 MMOL/L
T4 FREE SERPL-MCNC: 1.9 NG/DL
TRIGL SERPL-MCNC: 132 MG/DL
TSH SERPL-ACNC: 1.33 UIU/ML

## 2023-12-29 RX ORDER — LEVOTHYROXINE SODIUM 0.1 MG/1
100 TABLET ORAL DAILY
Qty: 90 | Refills: 1 | Status: ACTIVE | COMMUNITY
Start: 2021-12-09 | End: 1900-01-01

## 2024-01-03 PROBLEM — E11.9 TYPE 2 DIABETES MELLITUS WITHOUT COMPLICATION, WITHOUT LONG-TERM CURRENT USE OF INSULIN: Status: ACTIVE | Noted: 2024-01-03

## 2024-03-21 RX ORDER — METFORMIN ER 500 MG 500 MG/1
500 TABLET ORAL
Qty: 90 | Refills: 3 | Status: ACTIVE | COMMUNITY
Start: 2023-12-26 | End: 1900-01-01

## 2024-03-26 LAB
ALBUMIN SERPL ELPH-MCNC: 4.9 G/DL
ALP BLD-CCNC: 80 U/L
ALT SERPL-CCNC: 87 U/L
ANION GAP SERPL CALC-SCNC: 12 MMOL/L
AST SERPL-CCNC: 40 U/L
BILIRUB SERPL-MCNC: 0.4 MG/DL
BUN SERPL-MCNC: 17 MG/DL
CALCIUM SERPL-MCNC: 9.7 MG/DL
CHLORIDE SERPL-SCNC: 104 MMOL/L
CO2 SERPL-SCNC: 25 MMOL/L
CREAT SERPL-MCNC: 1.23 MG/DL
CREAT SPEC-SCNC: 204 MG/DL
EGFR: 66 ML/MIN/1.73M2
ESTIMATED AVERAGE GLUCOSE: 126 MG/DL
GLUCOSE SERPL-MCNC: 86 MG/DL
HBA1C MFR BLD HPLC: 6 %
MICROALBUMIN 24H UR DL<=1MG/L-MCNC: 35.6 MG/DL
MICROALBUMIN/CREAT 24H UR-RTO: 175 MG/G
POTASSIUM SERPL-SCNC: 4.6 MMOL/L
PROT SERPL-MCNC: 7.2 G/DL
SODIUM SERPL-SCNC: 141 MMOL/L

## 2024-03-28 ENCOUNTER — RX CHANGE (OUTPATIENT)
Age: 64
End: 2024-03-28

## 2024-03-28 RX ORDER — TIRZEPATIDE 2.5 MG/.5ML
2.5 INJECTION, SOLUTION SUBCUTANEOUS
Qty: 4 | Refills: 3 | Status: ACTIVE | COMMUNITY
Start: 2024-03-28 | End: 1900-01-01

## 2024-03-28 RX ORDER — TIRZEPATIDE 5 MG/.5ML
5 INJECTION, SOLUTION SUBCUTANEOUS
Qty: 4 | Refills: 1 | Status: DISCONTINUED | COMMUNITY
Start: 2023-12-26 | End: 2024-03-28

## 2024-03-29 RX ORDER — TIRZEPATIDE 5 MG/.5ML
5 INJECTION, SOLUTION SUBCUTANEOUS
Qty: 4 | Refills: 3 | Status: ACTIVE | COMMUNITY
Start: 2024-03-29 | End: 1900-01-01

## 2024-06-01 RX ORDER — TIRZEPATIDE 10 MG/.5ML
10 INJECTION, SOLUTION SUBCUTANEOUS
Qty: 4 | Refills: 1 | Status: ACTIVE | COMMUNITY
Start: 2024-03-15 | End: 1900-01-01

## 2024-06-29 LAB
ALBUMIN SERPL ELPH-MCNC: 4.7 G/DL
ALP BLD-CCNC: 72 U/L
ALT SERPL-CCNC: 50 U/L
ANION GAP SERPL CALC-SCNC: 9 MMOL/L
AST SERPL-CCNC: 28 U/L
BILIRUB SERPL-MCNC: 0.3 MG/DL
BUN SERPL-MCNC: 15 MG/DL
CALCIUM SERPL-MCNC: 9.8 MG/DL
CHLORIDE SERPL-SCNC: 106 MMOL/L
CO2 SERPL-SCNC: 25 MMOL/L
CREAT SERPL-MCNC: 1.25 MG/DL
EGFR: 64 ML/MIN/1.73M2
ESTIMATED AVERAGE GLUCOSE: 128 MG/DL
GLUCOSE SERPL-MCNC: 121 MG/DL
HBA1C MFR BLD HPLC: 6.1 %
POTASSIUM SERPL-SCNC: 4.5 MMOL/L
PROT SERPL-MCNC: 6.8 G/DL
SODIUM SERPL-SCNC: 140 MMOL/L

## 2024-09-04 DIAGNOSIS — H10.31 UNSPECIFIED ACUTE CONJUNCTIVITIS, RIGHT EYE: ICD-10-CM

## 2024-09-04 RX ORDER — NEOMYCIN AND POLYMYXIN B SULFATES AND BACITRACIN ZINC 400; 3.5; 1 [USP'U]/G; MG/G; [USP'U]/G
3.5-400-1 OINTMENT OPHTHALMIC
Qty: 1 | Refills: 0 | Status: ACTIVE | COMMUNITY
Start: 2024-09-04 | End: 1900-01-01

## 2024-11-21 DIAGNOSIS — H10.31 UNSPECIFIED ACUTE CONJUNCTIVITIS, RIGHT EYE: ICD-10-CM

## 2024-11-21 DIAGNOSIS — R30.0 DYSURIA: ICD-10-CM

## 2024-11-29 RX ORDER — CIPROFLOXACIN HYDROCHLORIDE 500 MG/1
500 TABLET, FILM COATED ORAL TWICE DAILY
Qty: 28 | Refills: 0 | Status: DISCONTINUED | COMMUNITY
Start: 2024-11-21 | End: 2024-11-29

## 2024-11-29 RX ORDER — TIRZEPATIDE 10 MG/.5ML
10 INJECTION, SOLUTION SUBCUTANEOUS
Qty: 4 | Refills: 0 | Status: ACTIVE | COMMUNITY
Start: 2024-11-21 | End: 1900-01-01

## 2024-12-16 DIAGNOSIS — R06.09 OTHER FORMS OF DYSPNEA: ICD-10-CM

## 2024-12-16 DIAGNOSIS — R10.9 UNSPECIFIED ABDOMINAL PAIN: ICD-10-CM

## 2024-12-16 DIAGNOSIS — Z87.898 PERSONAL HISTORY OF OTHER SPECIFIED CONDITIONS: ICD-10-CM

## 2024-12-16 RX ORDER — AMITRIPTYLINE HYDROCHLORIDE 10 MG/1
10 TABLET, FILM COATED ORAL
Qty: 40 | Refills: 1 | Status: ACTIVE | COMMUNITY
Start: 2024-12-16 | End: 1900-01-01

## 2024-12-25 PROBLEM — F10.90 ALCOHOL USE: Status: ACTIVE | Noted: 2019-02-20

## 2025-01-14 DIAGNOSIS — D49.89 NEOPLASM OF UNSPECIFIED BEHAVIOR OF OTHER SPECIFIED SITES: ICD-10-CM

## 2025-01-14 DIAGNOSIS — E11.9 TYPE 2 DIABETES MELLITUS W/OUT COMPLICATIONS: ICD-10-CM

## 2025-01-14 DIAGNOSIS — E78.5 HYPERLIPIDEMIA, UNSPECIFIED: ICD-10-CM

## 2025-01-14 DIAGNOSIS — I25.10 ATHEROSCLEROTIC HEART DISEASE OF NATIVE CORONARY ARTERY W/OUT ANGINA PECTORIS: ICD-10-CM

## 2025-01-14 DIAGNOSIS — Z12.5 ENCOUNTER FOR SCREENING FOR MALIGNANT NEOPLASM OF PROSTATE: ICD-10-CM

## 2025-01-14 DIAGNOSIS — R79.89 OTHER SPECIFIED ABNORMAL FINDINGS OF BLOOD CHEMISTRY: ICD-10-CM

## 2025-01-25 RX ORDER — TIRZEPATIDE 10 MG/.5ML
10 INJECTION, SOLUTION SUBCUTANEOUS
Qty: 4 | Refills: 3 | Status: ACTIVE | COMMUNITY
Start: 2025-01-25 | End: 1900-01-01

## 2025-03-20 ENCOUNTER — NON-APPOINTMENT (OUTPATIENT)
Age: 65
End: 2025-03-20

## 2025-03-28 ENCOUNTER — RESULT REVIEW (OUTPATIENT)
Age: 65
End: 2025-03-28

## 2025-03-31 ENCOUNTER — NON-APPOINTMENT (OUTPATIENT)
Age: 65
End: 2025-03-31

## 2025-06-27 PROBLEM — K12.0 APHTHOUS ULCER OF MOUTH: Status: ACTIVE | Noted: 2025-06-27

## 2025-06-27 RX ORDER — TRIAMCINOLONE ACETONIDE 1 MG/G
0.1 PASTE DENTAL
Qty: 1 | Refills: 0 | Status: ACTIVE | COMMUNITY
Start: 2025-06-27 | End: 1900-01-01

## 2025-09-14 PROBLEM — R73.03 PREDIABETES: Status: RESOLVED | Noted: 2019-03-18 | Resolved: 2025-09-14

## 2025-09-14 PROBLEM — K12.0 APHTHOUS ULCER OF MOUTH: Status: RESOLVED | Noted: 2025-06-27 | Resolved: 2025-09-14
